# Patient Record
Sex: FEMALE | Race: WHITE | NOT HISPANIC OR LATINO | Employment: OTHER | URBAN - METROPOLITAN AREA
[De-identification: names, ages, dates, MRNs, and addresses within clinical notes are randomized per-mention and may not be internally consistent; named-entity substitution may affect disease eponyms.]

---

## 2017-01-10 ENCOUNTER — LAB (OUTPATIENT)
Dept: LAB | Facility: HOSPITAL | Age: 82
End: 2017-01-10

## 2017-01-10 ENCOUNTER — OFFICE VISIT (OUTPATIENT)
Dept: ONCOLOGY | Facility: CLINIC | Age: 82
End: 2017-01-10

## 2017-01-10 VITALS
TEMPERATURE: 98 F | BODY MASS INDEX: 26.79 KG/M2 | RESPIRATION RATE: 12 BRPM | OXYGEN SATURATION: 99 % | HEIGHT: 63 IN | SYSTOLIC BLOOD PRESSURE: 140 MMHG | WEIGHT: 151.2 LBS | DIASTOLIC BLOOD PRESSURE: 86 MMHG | HEART RATE: 68 BPM

## 2017-01-10 DIAGNOSIS — R13.10 DYSPHAGIA, UNSPECIFIED TYPE: ICD-10-CM

## 2017-01-10 DIAGNOSIS — K90.9 MALABSORPTION OF IRON: ICD-10-CM

## 2017-01-10 DIAGNOSIS — D50.9 IRON DEFICIENCY ANEMIA: ICD-10-CM

## 2017-01-10 DIAGNOSIS — D50.9 IRON DEFICIENCY ANEMIA, UNSPECIFIED IRON DEFICIENCY ANEMIA TYPE: ICD-10-CM

## 2017-01-10 DIAGNOSIS — D47.2 MONOCLONAL GAMMOPATHY: Primary | ICD-10-CM

## 2017-01-10 LAB
BASOPHILS # BLD AUTO: 0.04 10*3/MM3 (ref 0–0.1)
BASOPHILS NFR BLD AUTO: 0.7 % (ref 0–1.1)
DEPRECATED RDW RBC AUTO: 44.4 FL (ref 37–49)
EOSINOPHIL # BLD AUTO: 0.31 10*3/MM3 (ref 0–0.36)
EOSINOPHIL NFR BLD AUTO: 5.6 % (ref 1–5)
ERYTHROCYTE [DISTWIDTH] IN BLOOD BY AUTOMATED COUNT: 13.6 % (ref 11.7–14.5)
FERRITIN SERPL-MCNC: 12.6 NG/ML
HCT VFR BLD AUTO: 34 % (ref 34–45)
HGB BLD-MCNC: 10.5 G/DL (ref 11.5–14.9)
IMM GRANULOCYTES # BLD: 0.03 10*3/MM3 (ref 0–0.03)
IMM GRANULOCYTES NFR BLD: 0.5 % (ref 0–0.5)
IRON 24H UR-MRATE: 154 MCG/DL (ref 37–145)
IRON SATN MFR SERPL: 35 % (ref 14–48)
LYMPHOCYTES # BLD AUTO: 0.9 10*3/MM3 (ref 1–3.5)
LYMPHOCYTES NFR BLD AUTO: 16.4 % (ref 20–49)
MCH RBC QN AUTO: 27.7 PG (ref 27–33)
MCHC RBC AUTO-ENTMCNC: 30.9 G/DL (ref 32–35)
MCV RBC AUTO: 89.7 FL (ref 83–97)
MONOCYTES # BLD AUTO: 0.51 10*3/MM3 (ref 0.25–0.8)
MONOCYTES NFR BLD AUTO: 9.3 % (ref 4–12)
NEUTROPHILS # BLD AUTO: 3.7 10*3/MM3 (ref 1.5–7)
NEUTROPHILS NFR BLD AUTO: 67.5 % (ref 39–75)
NRBC BLD MANUAL-RTO: 0 /100 WBC (ref 0–0)
PLATELET # BLD AUTO: 203 10*3/MM3 (ref 150–375)
PMV BLD AUTO: 10.8 FL (ref 8.9–12.1)
RBC # BLD AUTO: 3.79 10*6/MM3 (ref 3.9–5)
TIBC SERPL-MCNC: 437 MCG/DL (ref 249–505)
TRANSFERRIN SERPL-MCNC: 312 MG/DL (ref 200–360)
WBC NRBC COR # BLD: 5.49 10*3/MM3 (ref 4–10)

## 2017-01-10 PROCEDURE — 83540 ASSAY OF IRON: CPT | Performed by: INTERNAL MEDICINE

## 2017-01-10 PROCEDURE — 82728 ASSAY OF FERRITIN: CPT | Performed by: INTERNAL MEDICINE

## 2017-01-10 PROCEDURE — 85025 COMPLETE CBC W/AUTO DIFF WBC: CPT | Performed by: INTERNAL MEDICINE

## 2017-01-10 PROCEDURE — 84466 ASSAY OF TRANSFERRIN: CPT | Performed by: INTERNAL MEDICINE

## 2017-01-10 PROCEDURE — 99214 OFFICE O/P EST MOD 30 MIN: CPT | Performed by: INTERNAL MEDICINE

## 2017-01-10 PROCEDURE — 36415 COLL VENOUS BLD VENIPUNCTURE: CPT | Performed by: INTERNAL MEDICINE

## 2017-01-10 RX ORDER — DIPHENHYDRAMINE HCL 25 MG
25 CAPSULE ORAL ONCE
Status: CANCELLED | OUTPATIENT
Start: 2017-01-12

## 2017-01-10 RX ORDER — SODIUM CHLORIDE 9 MG/ML
250 INJECTION, SOLUTION INTRAVENOUS ONCE
Status: CANCELLED | OUTPATIENT
Start: 2017-01-12

## 2017-01-10 NOTE — PROGRESS NOTES
Subjective     CHIEF COMPLAINT:    1. Monoclonal gammopathy of undetermined significance.   2. Anemia.     HISTORY OF PRESENT ILLNESS:     Randi Mera is an 89 y.o. patient who returns today for follow-up accompanied by her son.  She reports having dysphagia.  She feels that her symptoms were getting worse while taking oral iron.  Therefore, she stopped oral iron and she has been taking gummy vitamins once daily.    She denies noticing blood in the stool or urine.  She denies having any melena.      Past Medical History   Diagnosis Date   • Anemia    • Degenerative arthritis    • Fatigue    • Monoclonal gammopathy of undetermined significance    • Rheumatic fever      AS A CHILD       Past Surgical History   Procedure Laterality Date   • Replacement total knee Right 2000   • Cholecystectomy  2008       Cancer-related family history includes Cancer in her brother and brother.    SCHEDULED MEDS:    Current Outpatient Prescriptions:   •  amLODIPine (NORVASC) 10 MG tablet, Take 10 mg by mouth daily., Disp: , Rfl:   •  aspirin 81 MG EC tablet, Take 81 mg by mouth daily., Disp: , Rfl:   •  escitalopram (LEXAPRO) 10 MG tablet, Take 10 mg by mouth daily., Disp: , Rfl:   •  ferrous sulfate 325 (65 FE) MG tablet, Take 325 mg by mouth 3 (three) times a day., Disp: , Rfl:   •  hydrochlorothiazide (HYDRODIURIL) 25 MG tablet, TAKE ONE TABLET BY MOUTH DAILY, Disp: , Rfl: 5  •  metoclopramide (REGLAN) 10 MG tablet, TAKE ONE TABLET BY MOUTH FOUR TIMES DAILY, Disp: , Rfl: 11  •  pantoprazole (PROTONIX) 40 MG EC tablet, TAKE ONE TABLET BY MOUTH EVERY DAY, Disp: , Rfl: 5  •  sucralfate (CARAFATE) 1 G tablet, TAKE ONE TABLET BY MOUTH FOUR TIMES DAILY ON EMPTY stomach ONE hour BEFORE MEALS AND AT BEDTIME, Disp: , Rfl: 11  •  SYMBICORT 160-4.5 MCG/ACT inhaler, TWO PUFFS EVERY TWELVE HOURS RINSE MOUTH AFTER EACH USE., Disp: , Rfl: 11    REVIEW OF SYSTEMS:  GENERAL:  Fatigue.   SKIN:  No rashes or lesions.  HEME/LYMPH: Anemia.  No  "bruising or bleeding.  EYES:  No vision changes or diplopia.  ENT:  No mouth or gum bleeding, epistaxis, hoarseness or dysphagia.  RESPIRATORY:  No cough, shortness of breath, hemoptysis or wheezing.  CVS:  No chest pain, palpitations or lower extremity swelling.  GI:  No dysphagia.  Stomach upset after taking iron tablets.  :  No dysuria, hematuria or increased frequency.   MUSCULOSKELETAL:  No bone pain or joint stiffness.  NEUROLOGICAL:  Difficulty with word finding.  PSYCHIATRIC:  No anxiety, mood changes or depression.  .  Objective   VITAL SIGNS:  Vitals:    01/10/17 1335   BP: 140/86   Pulse: 68   Resp: 12   Temp: 98 °F (36.7 °C)   TempSrc: Oral   SpO2: 99%   Weight: 151 lb 3.2 oz (68.6 kg)   Height: 63.39\" (161 cm)  Comment: new height   PainSc: 0-No pain       Wt Readings from Last 3 Encounters:   01/10/17 151 lb 3.2 oz (68.6 kg)   07/26/16 150 lb 12.8 oz (68.4 kg)   05/19/16 155 lb 12.8 oz (70.7 kg)       PHYSICAL EXAMINATION:  GENERAL:  The patient appears in good general condition, not in acute distress.  SKIN:  No skin rashes or lesions. No ecchymosis or petechiae.  HEAD:  Normocephalic.  EYES:  No jaundice.  Pale.   LYMPHATICS:  No cervical or supraclavicular lymphadenopathy.  ABDOMEN:  Soft and non-tender. No hepato- or splenomegaly. No masses.  EXTREMITIES:  No cyanosis or edema. No calf tenderness.  .  RESULT REVIEW:       Results from last 7 days  Lab Units 01/10/17  1308   WBC 10*3/mm3 5.49   HEMOGLOBIN g/dL 10.5*   HEMATOCRIT % 34.0   PLATELETS 10*3/mm3 203       Results from last 7 days  Lab Units 01/10/17  1308   FERRITIN ng/mL 12.60   IRON mcg/dL 154*   TIBC mcg/dL 437           Assessment/Plan    1.  Iron deficiency anemia.  Her hemoglobin has worsened.  She is not tolerating oral iron with development of upset stomach and worsening dysphagia.  She, she was not absorbing the iron in the multivitamin. Therefore, I recommended IV iron in the form of IV Feraheme.  I discussed side effects " including infusion reactions which are not common.  Patient agreed to proceed.  I asked her to stop the oral iron.  We will schedule her to receive IV iron in 1 and in 2 weeks.    2.  Monoclonal gammopathy of undetermined significance. M protein was 0.5 in May 2016.  We will repeat the level in 4 months from now.      3.  Dysphagia and upset stomach.  She has history of hiatal hernia.  I recommended evaluation by gastroenterology.  She is on Carafate and Protonix.    We will see the patient follow-up in 4 months with a CBC ferritin and iron panel at her return visit.      Nadir Ceja MD  01/10/17

## 2017-01-12 ENCOUNTER — INFUSION (OUTPATIENT)
Dept: ONCOLOGY | Facility: HOSPITAL | Age: 82
End: 2017-01-12

## 2017-01-12 VITALS
OXYGEN SATURATION: 97 % | TEMPERATURE: 97.5 F | SYSTOLIC BLOOD PRESSURE: 167 MMHG | BODY MASS INDEX: 25.9 KG/M2 | WEIGHT: 148 LBS | DIASTOLIC BLOOD PRESSURE: 89 MMHG | HEART RATE: 70 BPM

## 2017-01-12 DIAGNOSIS — D50.9 IRON DEFICIENCY ANEMIA, UNSPECIFIED IRON DEFICIENCY ANEMIA TYPE: ICD-10-CM

## 2017-01-12 DIAGNOSIS — R13.10 DYSPHAGIA, UNSPECIFIED TYPE: Primary | ICD-10-CM

## 2017-01-12 PROCEDURE — 25010000002 FERUMOXYTOL 510 MG/17ML SOLUTION 510 MG VIAL: Performed by: INTERNAL MEDICINE

## 2017-01-12 PROCEDURE — 63710000001 DIPHENHYDRAMINE PER 50 MG: Performed by: INTERNAL MEDICINE

## 2017-01-12 PROCEDURE — 96375 TX/PRO/DX INJ NEW DRUG ADDON: CPT | Performed by: INTERNAL MEDICINE

## 2017-01-12 PROCEDURE — 96374 THER/PROPH/DIAG INJ IV PUSH: CPT | Performed by: INTERNAL MEDICINE

## 2017-01-12 RX ORDER — DIPHENHYDRAMINE HCL 25 MG
25 CAPSULE ORAL ONCE
Status: COMPLETED | OUTPATIENT
Start: 2017-01-12 | End: 2017-01-12

## 2017-01-12 RX ORDER — DIPHENHYDRAMINE HCL 25 MG
25 CAPSULE ORAL ONCE
Status: CANCELLED | OUTPATIENT
Start: 2017-01-19

## 2017-01-12 RX ORDER — SODIUM CHLORIDE 9 MG/ML
250 INJECTION, SOLUTION INTRAVENOUS ONCE
Status: CANCELLED | OUTPATIENT
Start: 2017-01-19

## 2017-01-12 RX ORDER — SODIUM CHLORIDE 9 MG/ML
250 INJECTION, SOLUTION INTRAVENOUS ONCE
Status: COMPLETED | OUTPATIENT
Start: 2017-01-12 | End: 2017-01-12

## 2017-01-12 RX ADMIN — DIPHENHYDRAMINE HYDROCHLORIDE 25 MG: 25 CAPSULE ORAL at 13:21

## 2017-01-12 RX ADMIN — FAMOTIDINE 20 MG: 10 INJECTION INTRAVENOUS at 13:21

## 2017-01-12 RX ADMIN — SODIUM CHLORIDE 250 ML: 900 INJECTION, SOLUTION INTRAVENOUS at 13:21

## 2017-01-12 RX ADMIN — FERUMOXYTOL 510 MG: 510 INJECTION INTRAVENOUS at 13:53

## 2017-01-19 ENCOUNTER — INFUSION (OUTPATIENT)
Dept: ONCOLOGY | Facility: HOSPITAL | Age: 82
End: 2017-01-19

## 2017-01-19 VITALS
BODY MASS INDEX: 26.6 KG/M2 | WEIGHT: 152 LBS | HEART RATE: 67 BPM | DIASTOLIC BLOOD PRESSURE: 82 MMHG | TEMPERATURE: 98 F | SYSTOLIC BLOOD PRESSURE: 134 MMHG

## 2017-01-19 DIAGNOSIS — R13.10 DYSPHAGIA, UNSPECIFIED TYPE: Primary | ICD-10-CM

## 2017-01-19 DIAGNOSIS — D50.9 IRON DEFICIENCY ANEMIA, UNSPECIFIED IRON DEFICIENCY ANEMIA TYPE: ICD-10-CM

## 2017-01-19 PROCEDURE — 25010000002 FERUMOXYTOL 510 MG/17ML SOLUTION 510 MG VIAL: Performed by: INTERNAL MEDICINE

## 2017-01-19 PROCEDURE — 63710000001 DIPHENHYDRAMINE PER 50 MG: Performed by: INTERNAL MEDICINE

## 2017-01-19 PROCEDURE — 96374 THER/PROPH/DIAG INJ IV PUSH: CPT | Performed by: INTERNAL MEDICINE

## 2017-01-19 PROCEDURE — 96375 TX/PRO/DX INJ NEW DRUG ADDON: CPT | Performed by: INTERNAL MEDICINE

## 2017-01-19 RX ORDER — DIPHENHYDRAMINE HCL 25 MG
25 CAPSULE ORAL ONCE
Status: COMPLETED | OUTPATIENT
Start: 2017-01-19 | End: 2017-01-19

## 2017-01-19 RX ORDER — SODIUM CHLORIDE 9 MG/ML
250 INJECTION, SOLUTION INTRAVENOUS ONCE
Status: COMPLETED | OUTPATIENT
Start: 2017-01-19 | End: 2017-01-19

## 2017-01-19 RX ORDER — SODIUM CHLORIDE 9 MG/ML
250 INJECTION, SOLUTION INTRAVENOUS ONCE
Status: CANCELLED | OUTPATIENT
Start: 2017-01-26

## 2017-01-19 RX ORDER — DIPHENHYDRAMINE HCL 25 MG
25 CAPSULE ORAL ONCE
Status: CANCELLED | OUTPATIENT
Start: 2017-01-26

## 2017-01-19 RX ADMIN — SODIUM CHLORIDE 250 ML: 900 INJECTION, SOLUTION INTRAVENOUS at 12:49

## 2017-01-19 RX ADMIN — DIPHENHYDRAMINE HYDROCHLORIDE 25 MG: 25 CAPSULE ORAL at 12:47

## 2017-01-19 RX ADMIN — FAMOTIDINE 20 MG: 10 INJECTION INTRAVENOUS at 12:48

## 2017-01-19 RX ADMIN — FERUMOXYTOL 510 MG: 510 INJECTION INTRAVENOUS at 13:10

## 2017-05-02 ENCOUNTER — OFFICE VISIT (OUTPATIENT)
Dept: ONCOLOGY | Facility: CLINIC | Age: 82
End: 2017-05-02

## 2017-05-02 ENCOUNTER — LAB (OUTPATIENT)
Dept: LAB | Facility: HOSPITAL | Age: 82
End: 2017-05-02

## 2017-05-02 VITALS
WEIGHT: 148.4 LBS | RESPIRATION RATE: 16 BRPM | OXYGEN SATURATION: 98 % | SYSTOLIC BLOOD PRESSURE: 150 MMHG | BODY MASS INDEX: 26.29 KG/M2 | HEIGHT: 63 IN | DIASTOLIC BLOOD PRESSURE: 90 MMHG | TEMPERATURE: 97.6 F | HEART RATE: 69 BPM

## 2017-05-02 DIAGNOSIS — D50.9 IRON DEFICIENCY ANEMIA, UNSPECIFIED IRON DEFICIENCY ANEMIA TYPE: Primary | ICD-10-CM

## 2017-05-02 DIAGNOSIS — I10 ESSENTIAL HYPERTENSION: ICD-10-CM

## 2017-05-02 DIAGNOSIS — D47.2 MONOCLONAL GAMMOPATHY: ICD-10-CM

## 2017-05-02 DIAGNOSIS — R13.10 DYSPHAGIA, UNSPECIFIED TYPE: ICD-10-CM

## 2017-05-02 LAB
BASOPHILS # BLD AUTO: 0.04 10*3/MM3 (ref 0–0.1)
BASOPHILS NFR BLD AUTO: 0.8 % (ref 0–1.1)
DEPRECATED RDW RBC AUTO: 47.1 FL (ref 37–49)
EOSINOPHIL # BLD AUTO: 0.2 10*3/MM3 (ref 0–0.36)
EOSINOPHIL NFR BLD AUTO: 4.2 % (ref 1–5)
ERYTHROCYTE [DISTWIDTH] IN BLOOD BY AUTOMATED COUNT: 13.8 % (ref 11.7–14.5)
FERRITIN SERPL-MCNC: 50.1 NG/ML
HCT VFR BLD AUTO: 40.5 % (ref 34–45)
HGB BLD-MCNC: 12.9 G/DL (ref 11.5–14.9)
IMM GRANULOCYTES # BLD: 0.03 10*3/MM3 (ref 0–0.03)
IMM GRANULOCYTES NFR BLD: 0.6 % (ref 0–0.5)
IRON 24H UR-MRATE: 71 MCG/DL (ref 37–145)
IRON SATN MFR SERPL: 20 % (ref 14–48)
LYMPHOCYTES # BLD AUTO: 0.96 10*3/MM3 (ref 1–3.5)
LYMPHOCYTES NFR BLD AUTO: 20.1 % (ref 20–49)
MCH RBC QN AUTO: 29.2 PG (ref 27–33)
MCHC RBC AUTO-ENTMCNC: 31.9 G/DL (ref 32–35)
MCV RBC AUTO: 91.6 FL (ref 83–97)
MONOCYTES # BLD AUTO: 0.52 10*3/MM3 (ref 0.25–0.8)
MONOCYTES NFR BLD AUTO: 10.9 % (ref 4–12)
NEUTROPHILS # BLD AUTO: 3.03 10*3/MM3 (ref 1.5–7)
NEUTROPHILS NFR BLD AUTO: 63.4 % (ref 39–75)
NRBC BLD MANUAL-RTO: 0 /100 WBC (ref 0–0)
PLATELET # BLD AUTO: 194 10*3/MM3 (ref 150–375)
PMV BLD AUTO: 10.1 FL (ref 8.9–12.1)
RBC # BLD AUTO: 4.42 10*6/MM3 (ref 3.9–5)
TIBC SERPL-MCNC: 358 MCG/DL (ref 249–505)
TRANSFERRIN SERPL-MCNC: 256 MG/DL (ref 200–360)
WBC NRBC COR # BLD: 4.78 10*3/MM3 (ref 4–10)

## 2017-05-02 PROCEDURE — 85025 COMPLETE CBC W/AUTO DIFF WBC: CPT

## 2017-05-02 PROCEDURE — 84466 ASSAY OF TRANSFERRIN: CPT

## 2017-05-02 PROCEDURE — 99214 OFFICE O/P EST MOD 30 MIN: CPT | Performed by: INTERNAL MEDICINE

## 2017-05-02 PROCEDURE — 36415 COLL VENOUS BLD VENIPUNCTURE: CPT

## 2017-05-02 PROCEDURE — 82728 ASSAY OF FERRITIN: CPT

## 2017-05-02 PROCEDURE — 83540 ASSAY OF IRON: CPT

## 2017-05-02 RX ORDER — HYDROCHLOROTHIAZIDE 25 MG/1
25 TABLET ORAL DAILY
Qty: 30 TABLET | Refills: 0 | Status: SHIPPED | OUTPATIENT
Start: 2017-05-02 | End: 2017-08-22

## 2017-05-02 RX ORDER — SOLIFENACIN SUCCINATE 5 MG/1
TABLET, FILM COATED ORAL
Refills: 0 | COMMUNITY
Start: 2017-03-10 | End: 2019-01-16 | Stop reason: ALTCHOICE

## 2017-05-02 RX ORDER — FERROUS SULFATE 325(65) MG
325 TABLET ORAL
COMMUNITY
End: 2017-08-22

## 2017-07-05 ENCOUNTER — OFFICE VISIT (OUTPATIENT)
Dept: GASTROENTEROLOGY | Facility: CLINIC | Age: 82
End: 2017-07-05

## 2017-07-05 VITALS
BODY MASS INDEX: 27.72 KG/M2 | DIASTOLIC BLOOD PRESSURE: 82 MMHG | HEIGHT: 61 IN | SYSTOLIC BLOOD PRESSURE: 132 MMHG | WEIGHT: 146.8 LBS | TEMPERATURE: 97.7 F

## 2017-07-05 DIAGNOSIS — R19.7 DIARRHEA, UNSPECIFIED TYPE: ICD-10-CM

## 2017-07-05 DIAGNOSIS — R12 HEARTBURN: ICD-10-CM

## 2017-07-05 DIAGNOSIS — R13.14 PHARYNGOESOPHAGEAL DYSPHAGIA: Primary | ICD-10-CM

## 2017-07-05 PROCEDURE — 99204 OFFICE O/P NEW MOD 45 MIN: CPT | Performed by: INTERNAL MEDICINE

## 2017-07-05 NOTE — PATIENT INSTRUCTIONS
Schedule the esophagram test    Start the citrucel twice a day with meals    Take pantoprazole every morning before breakfast    For any additional questions, concerns or changes to your condition after today's office visit please contact the office at [phone].

## 2017-07-05 NOTE — PROGRESS NOTES
"Chief Complaint   Patient presents with   • Diarrhea   • Heartburn   • Difficulty Swallowing       Subjective     HPI    Randi Mera is a 89 y.o. female with a past medical history noted below who presents for evaluation of dysphagia, heartburn and diarrhea.      Dysphagia present for \"years.\"  About 1 time per week, she will have food \"hang up\" at her lower esophagus.  Happens with dry crusty foods, meats.  When she either swallows too quickly or does not chew her food well.  She has required visits to the emergency room where they have given her carbonated beverages which tend to help her symptoms.  Either the food will pass or she will have regurgitation.  He reports what sounds like an esophagram greater than 20 years ago.  She finds that she does well she chews her food \"to a gruel\" and drinks Sprite or ginger ale with her meals.    Heartburn-- she is on, carafate in the morning and before meals seems to help.  Also on pantop but takes it at about noon    Diarrhea has been occurring about once per week for the past 3 weeks.  Baseline of a formed stool every other day.  Diarrhea will be profuse watery stool for about 4 episodes. No obvious precipitants-- either medication or foods.    Iron deficiency followed by hematology-- had been on oral supplementation and IV iron.  6/30 labs reviewed and Hb 12.2 and ferritin of 22.  She will see Dr Patton this month.    Weight has been stable. Denies nausea or vomiting    She reports a colonoscopy about 5 years at [facility] and reports this was normal.  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  No family history of GI malignancy.    Past Medical History:   Diagnosis Date   • Anemia    • Degenerative arthritis    • Fatigue    • GERD (gastroesophageal reflux disease)    • Hypertension    • Monoclonal gammopathy of undetermined significance    • Rheumatic fever     AS A CHILD         Current Outpatient Prescriptions:   •  amLODIPine (NORVASC) " 10 MG tablet, Take 10 mg by mouth daily., Disp: , Rfl:   •  aspirin 81 MG EC tablet, Take 81 mg by mouth daily., Disp: , Rfl:   •  escitalopram (LEXAPRO) 10 MG tablet, Take 10 mg by mouth daily., Disp: , Rfl:   •  Multiple Vitamins-Minerals (CENTRUM SILVER PO), Take  by mouth Daily., Disp: , Rfl:   •  Multiple Vitamins-Minerals (OCUVITE PO), Take  by mouth Daily., Disp: , Rfl:   •  pantoprazole (PROTONIX) 40 MG EC tablet, TAKE ONE TABLET BY MOUTH EVERY DAY, Disp: , Rfl: 5  •  sucralfate (CARAFATE) 1 G tablet, TAKE ONE TABLET BY MOUTH FOUR TIMES DAILY ON EMPTY stomach ONE hour BEFORE MEALS AND AT BEDTIME, Disp: , Rfl: 11  •  SYMBICORT 160-4.5 MCG/ACT inhaler, TWO PUFFS EVERY TWELVE HOURS RINSE MOUTH AFTER EACH USE., Disp: , Rfl: 11  •  VESICARE 5 MG tablet, TAKE ONE TABLET BY MOUTH DAILY, Disp: , Rfl: 0  •  cephalexin (KEFLEX) 500 MG capsule, Take 1 capsule by mouth 3 (Three) Times a Day., Disp: 21 capsule, Rfl: 0  •  ferrous sulfate 325 (65 FE) MG tablet, Take 325 mg by mouth Daily With Breakfast., Disp: , Rfl:   •  hydrochlorothiazide (HYDRODIURIL) 25 MG tablet, Take 1 tablet by mouth Daily., Disp: 30 tablet, Rfl: 0  •  methylcellulose, Laxative, (CITRUCEL) 500 MG tablet tablet, Take 2 tablets by mouth 2 (Two) Times a Day With Meals., Disp: 120 tablet, Rfl: 3  •  metoclopramide (REGLAN) 10 MG tablet, TAKE ONE TABLET BY MOUTH FOUR TIMES DAILY, Disp: , Rfl: 11    Allergies   Allergen Reactions   • Actonel [Risedronate Sodium]    • Furacin [Nitrofurazone]    • Lambs Quarters    • Latex    • Prozac [Fluoxetine Hcl]    • Shellfish-Derived Products        Social History     Social History   • Marital status:      Spouse name: N/A   • Number of children: N/A   • Years of education: N/A     Occupational History   • Pharmacist Retired     Social History Main Topics   • Smoking status: Current Every Day Smoker     Packs/day: 0.25     Types: Cigarettes     Start date: 1940   • Smokeless tobacco: Former User       Comment: off and on thru out life   • Alcohol use Yes      Comment: seldom   • Drug use: No   • Sexual activity: Not on file     Other Topics Concern   • Not on file     Social History Narrative       Family History   Problem Relation Age of Onset   • Cancer Brother      Lung   • Cancer Brother      Prostate   • No Known Problems Mother    • No Known Problems Father        Review of Systems   Constitutional: Negative for activity change, appetite change and fatigue.   HENT: Positive for trouble swallowing. Negative for congestion and sore throat.    Respiratory: Negative.    Cardiovascular: Negative.    Gastrointestinal: Positive for diarrhea. Negative for abdominal distention, abdominal pain, blood in stool and nausea.   Endocrine: Negative for cold intolerance and heat intolerance.   Genitourinary: Negative for difficulty urinating, dysuria and frequency.   Musculoskeletal: Negative for arthralgias, back pain and myalgias.   Skin: Negative.    Hematological: Negative for adenopathy. Does not bruise/bleed easily.   All other systems reviewed and are negative.      Objective     Vitals:    07/05/17 0834   BP: 132/82   Temp: 97.7 °F (36.5 °C)     Last 2 weights    07/05/17  0834   Weight: 146 lb 12.8 oz (66.6 kg)     Body mass index is 27.74 kg/(m^2).    Physical Exam   Constitutional: She is oriented to person, place, and time. She appears well-developed and well-nourished. No distress.   HENT:   Head: Normocephalic and atraumatic.   Right Ear: External ear normal.   Left Ear: External ear normal.   Nose: Nose normal.   Mouth/Throat: Oropharynx is clear and moist.   Eyes: Conjunctivae and EOM are normal. Right eye exhibits no discharge. Left eye exhibits no discharge. No scleral icterus.   Neck: Normal range of motion. Neck supple. No thyromegaly present.   No supraclavicular adenopathy   Cardiovascular: Normal rate, regular rhythm, normal heart sounds and intact distal pulses.  Exam reveals no gallop.    No murmur  heard.  No lower extremity edema   Pulmonary/Chest: Effort normal and breath sounds normal. No respiratory distress. She has no wheezes.   Abdominal: Soft. Normal appearance and bowel sounds are normal. She exhibits no distension and no mass. There is no hepatosplenomegaly. There is no tenderness. There is no rigidity, no rebound and no guarding. No hernia.   Genitourinary:   Genitourinary Comments: Rectal exam deferred   Musculoskeletal: Normal range of motion. She exhibits no edema or tenderness.   No atrophy of upper or lower extremities.  Normal digits and nails of both hands.   Lymphadenopathy:     She has no cervical adenopathy.   Neurological: She is alert and oriented to person, place, and time. She displays no atrophy. Coordination normal.   Skin: Skin is warm and dry. No rash noted. She is not diaphoretic. No erythema.   Psychiatric: She has a normal mood and affect. Her behavior is normal. Judgment and thought content normal.   Vitals reviewed.      WBC   Date Value Ref Range Status   05/02/2017 4.78 4.00 - 10.00 10*3/mm3 Final     RBC   Date Value Ref Range Status   05/02/2017 4.42 3.90 - 5.00 10*6/mm3 Final     Hemoglobin   Date Value Ref Range Status   05/02/2017 12.9 11.5 - 14.9 g/dL Final     Hematocrit   Date Value Ref Range Status   05/02/2017 40.5 34.0 - 45.0 % Final     MCV   Date Value Ref Range Status   05/02/2017 91.6 83.0 - 97.0 fL Final     MCH   Date Value Ref Range Status   05/02/2017 29.2 27.0 - 33.0 pg Final     MCHC   Date Value Ref Range Status   05/02/2017 31.9 (L) 32.0 - 35.0 g/dL Final     RDW   Date Value Ref Range Status   05/02/2017 13.8 11.7 - 14.5 % Final     RDW-SD   Date Value Ref Range Status   05/02/2017 47.1 37.0 - 49.0 fl Final     MPV   Date Value Ref Range Status   05/02/2017 10.1 8.9 - 12.1 fL Final     Platelets   Date Value Ref Range Status   05/02/2017 194 150 - 375 10*3/mm3 Final     Neutrophil %   Date Value Ref Range Status   05/02/2017 63.4 39.0 - 75.0 % Final      Lymphocyte %   Date Value Ref Range Status   05/02/2017 20.1 20.0 - 49.0 % Final     Monocyte %   Date Value Ref Range Status   05/02/2017 10.9 4.0 - 12.0 % Final     Eosinophil %   Date Value Ref Range Status   05/02/2017 4.2 1.0 - 5.0 % Final     Basophil %   Date Value Ref Range Status   05/02/2017 0.8 0.0 - 1.1 % Final     Immature Grans %   Date Value Ref Range Status   05/02/2017 0.6 (H) 0.0 - 0.5 % Final     Neutrophils, Absolute   Date Value Ref Range Status   05/02/2017 3.03 1.50 - 7.00 10*3/mm3 Final     Lymphocytes, Absolute   Date Value Ref Range Status   05/02/2017 0.96 (L) 1.00 - 3.50 10*3/mm3 Final     Monocytes, Absolute   Date Value Ref Range Status   05/02/2017 0.52 0.25 - 0.80 10*3/mm3 Final     Eosinophils, Absolute   Date Value Ref Range Status   05/02/2017 0.20 0.00 - 0.36 10*3/mm3 Final     Basophils, Absolute   Date Value Ref Range Status   05/02/2017 0.04 0.00 - 0.10 10*3/mm3 Final     Immature Grans, Absolute   Date Value Ref Range Status   05/02/2017 0.03 0.00 - 0.03 10*3/mm3 Final     nRBC   Date Value Ref Range Status   05/02/2017 0.0 0.0 - 0.0 /100 WBC Final       Albumin   Date Value Ref Range Status   05/19/2016 3.4 3.2 - 5.6 g/dL Final   05/19/2016 3.4 3.2 - 5.6 g/dL Final     A/G Ratio   Date Value Ref Range Status   05/19/2016 1.3 0.7 - 2.0 Final   05/19/2016 1.3 0.7 - 2.0 Final         Imaging Results (last 7 days)     ** No results found for the last 168 hours. **            No notes on file    Assessment/Plan    1. Dysphagia: chronic issue, no recent workup.  Differential of hiatal hernia, strictrue, ring, dysmotility  2. Diarrhea: new issue, intermittent episodes.  I am suspicious for brief impaction followed by loose stools  3. Heartburn: chronic issue, mostly controlled with carafate and pantop    Plan  -esophagram for further eval of dysphagia  -to take pantop 30 mins before breakfast  -advised to ER for any food sticking >1 hour  -citrucel supplementation for  regularity  -back in 6 weeks to assess    Randi was seen today for diarrhea, heartburn and difficulty swallowing.    Diagnoses and all orders for this visit:    Pharyngoesophageal dysphagia  -     FL Esophagram Complete; Future    Diarrhea, unspecified type  -     methylcellulose, Laxative, (CITRUCEL) 500 MG tablet tablet; Take 2 tablets by mouth 2 (Two) Times a Day With Meals.    Heartburn        I have discussed the above plan with the patient.  They verbalize understanding and are in agreement with the plan.  They have been advised to contact the office for any questions, concerns, or changes related to their health.    Dictated utilizing Dragon dictation

## 2017-07-17 ENCOUNTER — HOSPITAL ENCOUNTER (OUTPATIENT)
Dept: GENERAL RADIOLOGY | Facility: HOSPITAL | Age: 82
Discharge: HOME | End: 2017-07-17
Attending: INTERNAL MEDICINE | Admitting: INTERNAL MEDICINE

## 2017-07-17 DIAGNOSIS — R13.14 PHARYNGOESOPHAGEAL DYSPHAGIA: ICD-10-CM

## 2017-07-17 PROCEDURE — 74220 X-RAY XM ESOPHAGUS 1CNTRST: CPT

## 2017-07-18 NOTE — PROGRESS NOTES
Called and discussed esophagram results.  Gave options of speech evaluation, EGD, or do nothing.  She reports that her symptoms have improved since taking her protonix earlier in the day.  Wishes to wait until her follow up appt 8/18 to make a decision.  I think this is reasonable.

## 2017-08-18 ENCOUNTER — OFFICE VISIT (OUTPATIENT)
Dept: GASTROENTEROLOGY | Facility: CLINIC | Age: 82
End: 2017-08-18

## 2017-08-18 VITALS
DIASTOLIC BLOOD PRESSURE: 78 MMHG | TEMPERATURE: 98 F | SYSTOLIC BLOOD PRESSURE: 130 MMHG | WEIGHT: 148.4 LBS | BODY MASS INDEX: 28.02 KG/M2 | HEIGHT: 61 IN

## 2017-08-18 DIAGNOSIS — K21.9 GASTROESOPHAGEAL REFLUX DISEASE, ESOPHAGITIS PRESENCE NOT SPECIFIED: ICD-10-CM

## 2017-08-18 DIAGNOSIS — T17.908A ASPIRATION INTO AIRWAY, INITIAL ENCOUNTER: ICD-10-CM

## 2017-08-18 DIAGNOSIS — R13.14 PHARYNGOESOPHAGEAL DYSPHAGIA: Primary | ICD-10-CM

## 2017-08-18 PROCEDURE — 99214 OFFICE O/P EST MOD 30 MIN: CPT | Performed by: INTERNAL MEDICINE

## 2017-08-18 RX ORDER — HYDROCHLOROTHIAZIDE 25 MG/1
TABLET ORAL
Qty: 30 TABLET | OUTPATIENT
Start: 2017-08-18

## 2017-08-18 NOTE — PROGRESS NOTES
Chief Complaint   Patient presents with   • Diarrhea   • Heartburn   • Difficulty Swallowing     Subjective     HPI  Randi Mera is a 89 y.o. female who presents for follow-up of dysphagia, diarrhea, and heartburn.  Her July 17 esophagram was notable for silent aspiration, a small Zenker's diverticulum, a large sliding hiatal hernia and a narrowing at the esophago-gastric junction at 1.6 cm.  she is seen with her daughter today.  She reports that symptoms are improved.  She denies any further episodes with things sticking.  Her daughter endorses that there may have been an issue where she was answering the phone while she was eating and choking as she started to speak.  She says that she has stopped the Carafate and has been doing well.  She stopped the Carafate because the pill burden was too much.  She remains on pantoprazole.  She says that she has felt the best that she has felt in a number of years.  She reports that bowel movements are normal.  Her weight is stable.  She is not avoiding any particular foods including when I asked her about dry breads or tough and chewing meats.  I brought up the issue of the silent aspiration, she and her daughter both deny any issues with frequent upper respiratory infections, coughing or sputtering with eating or frequent pneumonias.    She does report an episode about a week to 2 weeks ago where she awoke with numbness of both of her lower legs.  This did go away.  She has not notified her primary care physician.    Past Medical History:   Diagnosis Date   • Anemia    • Degenerative arthritis    • Fatigue    • GERD (gastroesophageal reflux disease)    • Hiatal hernia    • Hypertension    • Monoclonal gammopathy of undetermined significance    • Rheumatic fever     AS A CHILD   • Schatzki's ring    • Ulcer    • Zenker diverticulum        Social History     Social History   • Marital status:      Spouse name: N/A   • Number of children: N/A   • Years of education:  N/A     Occupational History   • Pharmacist Retired     Social History Main Topics   • Smoking status: Current Every Day Smoker     Packs/day: 0.25     Types: Cigarettes     Start date: 1940   • Smokeless tobacco: Former User      Comment: off and on thru out life   • Alcohol use Yes      Comment: seldom   • Drug use: No   • Sexual activity: Not Asked     Other Topics Concern   • None     Social History Narrative         Current Outpatient Prescriptions:   •  amLODIPine (NORVASC) 10 MG tablet, Take 10 mg by mouth daily., Disp: , Rfl:   •  aspirin 81 MG EC tablet, Take 81 mg by mouth daily., Disp: , Rfl:   •  escitalopram (LEXAPRO) 10 MG tablet, Take 10 mg by mouth daily., Disp: , Rfl:   •  hydrochlorothiazide (HYDRODIURIL) 25 MG tablet, Take 1 tablet by mouth Daily., Disp: 30 tablet, Rfl: 0  •  methylcellulose, Laxative, (CITRUCEL) 500 MG tablet tablet, Take 2 tablets by mouth 2 (Two) Times a Day With Meals., Disp: 120 tablet, Rfl: 3  •  Multiple Vitamins-Minerals (CENTRUM SILVER PO), Take  by mouth Daily., Disp: , Rfl:   •  Multiple Vitamins-Minerals (OCUVITE PO), Take  by mouth Daily., Disp: , Rfl:   •  pantoprazole (PROTONIX) 40 MG EC tablet, TAKE ONE TABLET BY MOUTH EVERY DAY, Disp: , Rfl: 5  •  SYMBICORT 160-4.5 MCG/ACT inhaler, TWO PUFFS EVERY TWELVE HOURS RINSE MOUTH AFTER EACH USE., Disp: , Rfl: 11  •  cephalexin (KEFLEX) 500 MG capsule, Take 1 capsule by mouth 3 (Three) Times a Day., Disp: 21 capsule, Rfl: 0  •  ferrous sulfate 325 (65 FE) MG tablet, Take 325 mg by mouth Daily With Breakfast., Disp: , Rfl:   •  metoclopramide (REGLAN) 10 MG tablet, TAKE ONE TABLET BY MOUTH FOUR TIMES DAILY, Disp: , Rfl: 11  •  sucralfate (CARAFATE) 1 G tablet, TAKE ONE TABLET BY MOUTH FOUR TIMES DAILY ON EMPTY stomach ONE hour BEFORE MEALS AND AT BEDTIME, Disp: , Rfl: 11  •  VESICARE 5 MG tablet, TAKE ONE TABLET BY MOUTH DAILY, Disp: , Rfl: 0    Review of Systems   Constitutional: Negative for activity change, appetite  change and fatigue.   HENT: Negative for sore throat and trouble swallowing.    Gastrointestinal: Negative for abdominal distention, abdominal pain and blood in stool.   Endocrine: Negative for cold intolerance and heat intolerance.   Genitourinary: Negative for difficulty urinating, dysuria and frequency.   Musculoskeletal: Negative for arthralgias, back pain and myalgias.   Hematological: Negative for adenopathy. Does not bruise/bleed easily.   All other systems reviewed and are negative.      Objective   Vitals:    08/18/17 1432   BP: 130/78   Temp: 98 °F (36.7 °C)     Last Weight    08/18/17  1432   Weight: 148 lb 6.4 oz (67.3 kg)     Body mass index is 28.04 kg/(m^2).      Physical Exam   Constitutional: She is oriented to person, place, and time. She appears well-developed and well-nourished. No distress.   HENT:   Head: Normocephalic and atraumatic.   Right Ear: External ear normal.   Left Ear: External ear normal.   Nose: Nose normal.   Eyes: Conjunctivae and EOM are normal. No scleral icterus.   Cardiovascular: Normal rate, regular rhythm, normal heart sounds and intact distal pulses.  Exam reveals no gallop.    No murmur heard.  No lower extremity edema   Pulmonary/Chest: Effort normal and breath sounds normal. No respiratory distress. She has no wheezes.   Abdominal: Soft. Normal appearance and bowel sounds are normal. She exhibits no distension and no mass. There is no hepatosplenomegaly. There is no rigidity, no rebound and no guarding.   Genitourinary:   Genitourinary Comments: Rectal exam deferred   Musculoskeletal: Normal range of motion. She exhibits no edema or tenderness.   Normal digits and nails of both hands.  No atrophy or wasting of upper or lower extremeties   Neurological: She is alert and oriented to person, place, and time. She displays no atrophy. Coordination normal.   Skin: Skin is warm and dry. No rash noted. She is not diaphoretic. No erythema.   Psychiatric: She has a normal mood  and affect. Her behavior is normal. Judgment and thought content normal.   Vitals reviewed.      WBC   Date Value Ref Range Status   05/02/2017 4.78 4.00 - 10.00 10*3/mm3 Final     RBC   Date Value Ref Range Status   05/02/2017 4.42 3.90 - 5.00 10*6/mm3 Final     Hemoglobin   Date Value Ref Range Status   05/02/2017 12.9 11.5 - 14.9 g/dL Final     Hematocrit   Date Value Ref Range Status   05/02/2017 40.5 34.0 - 45.0 % Final     MCV   Date Value Ref Range Status   05/02/2017 91.6 83.0 - 97.0 fL Final     MCH   Date Value Ref Range Status   05/02/2017 29.2 27.0 - 33.0 pg Final     MCHC   Date Value Ref Range Status   05/02/2017 31.9 (L) 32.0 - 35.0 g/dL Final     RDW   Date Value Ref Range Status   05/02/2017 13.8 11.7 - 14.5 % Final     RDW-SD   Date Value Ref Range Status   05/02/2017 47.1 37.0 - 49.0 fl Final     MPV   Date Value Ref Range Status   05/02/2017 10.1 8.9 - 12.1 fL Final     Platelets   Date Value Ref Range Status   05/02/2017 194 150 - 375 10*3/mm3 Final     Neutrophil %   Date Value Ref Range Status   05/02/2017 63.4 39.0 - 75.0 % Final     Lymphocyte %   Date Value Ref Range Status   05/02/2017 20.1 20.0 - 49.0 % Final     Monocyte %   Date Value Ref Range Status   05/02/2017 10.9 4.0 - 12.0 % Final     Eosinophil %   Date Value Ref Range Status   05/02/2017 4.2 1.0 - 5.0 % Final     Basophil %   Date Value Ref Range Status   05/02/2017 0.8 0.0 - 1.1 % Final     Immature Grans %   Date Value Ref Range Status   05/02/2017 0.6 (H) 0.0 - 0.5 % Final     Neutrophils, Absolute   Date Value Ref Range Status   05/02/2017 3.03 1.50 - 7.00 10*3/mm3 Final     Lymphocytes, Absolute   Date Value Ref Range Status   05/02/2017 0.96 (L) 1.00 - 3.50 10*3/mm3 Final     Monocytes, Absolute   Date Value Ref Range Status   05/02/2017 0.52 0.25 - 0.80 10*3/mm3 Final     Eosinophils, Absolute   Date Value Ref Range Status   05/02/2017 0.20 0.00 - 0.36 10*3/mm3 Final     Basophils, Absolute   Date Value Ref Range Status    05/02/2017 0.04 0.00 - 0.10 10*3/mm3 Final     Immature Grans, Absolute   Date Value Ref Range Status   05/02/2017 0.03 0.00 - 0.03 10*3/mm3 Final     nRBC   Date Value Ref Range Status   05/02/2017 0.0 0.0 - 0.0 /100 WBC Final       Lab Results   Component Value Date    PROTENTOTREF 6.1 05/19/2016    PROTENTOTREF 6.1 05/19/2016    ALBUMIN 3.4 05/19/2016    ALBUMIN 3.4 05/19/2016    LABIL2 1.3 05/19/2016    LABIL2 1.3 05/19/2016         Imaging Results (last 7 days)     ** No results found for the last 168 hours. **            Assessment/Plan    1. Dysphagia: Multiple areas of her esophagus to cause dysphagia including the upper esophagus at the site of the cricopharyngeus, at the site of the shot skis ring, and at the site of the hiatal hernia.  It does not appear that any diameter is less than 1.6 cm per the esophagram reports.  She is denying any current issues with swallowing difficulty.  2. Silent aspiration: documented on esophagram; she is not having any frequent respiratory infections from this  3. GERD: stable on pantoprazole  4. Leg numbness: resolved but she did not notify her pcp    Plan  -I discussed the risks of persistent aspiration with the patient and her daughter.  I do recommend an evaluation by speech therapy to determine the severity of the aspiration and for any recommendations to help her with swallowing.  Given that she is not having significant issues with food passage in her esophagus, I do not feel that an EGD with potential dilation is warranted at this time.  Additionally I don't know that she would be a good candidate for any surgical intervention of the hiatal hernia.  She seems fairly asymptomatic from it.  Her daughter does request that the speech therapist be at [facility] as that is closer for her.  I will have my nurse's arrange this for her.  -I will notify Dr. Agosto's office about her recent episode of leg numbness.  That this should be investigated and I  expressed this to the patient and her daughter.    Randi was seen today for diarrhea, heartburn and difficulty swallowing.    Diagnoses and all orders for this visit:    Pharyngoesophageal dysphagia    Aspiration into airway, initial encounter    Gastroesophageal reflux disease, esophagitis presence not specified        Dictated utilizing Dragon dictation

## 2017-08-18 NOTE — TELEPHONE ENCOUNTER
Hydrochlorothiazide refill denied.  Per Dr. Ceja's dictation on 5-2, he refilled it for her because she ran out but was told to get from PCP from here on out.  It is for management of B/P and we do not manage.

## 2017-08-18 NOTE — PATIENT INSTRUCTIONS
Continue the pantoprazole    Continue to be careful with eating and drinking    Will refer to speech therapy at [facility]    Follow up with Dr Agosto regarding recent leg numbness    For any additional questions, concerns or changes to your condition after today's office visit please contact the office at [phone].

## 2017-08-21 ENCOUNTER — TELEPHONE (OUTPATIENT)
Dept: GASTROENTEROLOGY | Facility: CLINIC | Age: 82
End: 2017-08-21

## 2017-08-21 DIAGNOSIS — R13.10 DYSPHAGIA, UNSPECIFIED TYPE: Primary | ICD-10-CM

## 2017-08-21 NOTE — TELEPHONE ENCOUNTER
Call to Dr Thony Agosto's office @ [phone] and spoke with Ruchi.  Report that pt had episode of bilateral lower extremity numbness a week and a half ago.  Has resolved, but needs f/u.  Appt made for pt on 8/24/17 @ 7620.  Ruchi also requests last o/v note - faxed via [employer].    Call to pt to advise of above.  Pt verb understanding and accepts appt.  Advise pt that will be receiving appt for VFSS    Order placed for VFSS - message to JOSSY Mayo.

## 2017-08-21 NOTE — TELEPHONE ENCOUNTER
----- Message from Rosalie Perez MD sent at 8/18/2017  5:20 PM EDT -----  Need to things on her:  #1: Every week contact Dr. Smith office regarding an episode where she had bilateral lower extremity numbness.  This was about a week and a half ago.  It is since resolved but she has not had any follow-up for this  #2: Can we get her referred to speech therapy for a VFSS and relation and treatment.  She has significant laryngeal penetration and silent aspiration identified on her recent esophagram test    Thank you

## 2017-08-21 NOTE — TELEPHONE ENCOUNTER
I am signing this order for Dr. Rosalie Perez who is out of the office this week.  Once results are ready for review they will be forwarded to Dr. Perez for further evaluation and pt treatment plan.

## 2017-08-22 ENCOUNTER — LAB (OUTPATIENT)
Dept: LAB | Facility: HOSPITAL | Age: 82
End: 2017-08-22

## 2017-08-22 ENCOUNTER — APPOINTMENT (OUTPATIENT)
Dept: ONCOLOGY | Facility: CLINIC | Age: 82
End: 2017-08-22

## 2017-08-22 ENCOUNTER — APPOINTMENT (OUTPATIENT)
Dept: LAB | Facility: HOSPITAL | Age: 82
End: 2017-08-22

## 2017-08-22 ENCOUNTER — OFFICE VISIT (OUTPATIENT)
Dept: ONCOLOGY | Facility: CLINIC | Age: 82
End: 2017-08-22

## 2017-08-22 VITALS
SYSTOLIC BLOOD PRESSURE: 114 MMHG | DIASTOLIC BLOOD PRESSURE: 66 MMHG | HEIGHT: 63 IN | TEMPERATURE: 97.8 F | WEIGHT: 152.6 LBS | RESPIRATION RATE: 16 BRPM | OXYGEN SATURATION: 99 % | BODY MASS INDEX: 27.04 KG/M2 | HEART RATE: 80 BPM

## 2017-08-22 DIAGNOSIS — D47.2 MONOCLONAL GAMMOPATHY: ICD-10-CM

## 2017-08-22 DIAGNOSIS — D50.9 IRON DEFICIENCY ANEMIA, UNSPECIFIED IRON DEFICIENCY ANEMIA TYPE: ICD-10-CM

## 2017-08-22 DIAGNOSIS — R20.0 NUMBNESS IN BOTH LEGS: ICD-10-CM

## 2017-08-22 DIAGNOSIS — D50.9 IRON DEFICIENCY ANEMIA, UNSPECIFIED IRON DEFICIENCY ANEMIA TYPE: Primary | ICD-10-CM

## 2017-08-22 PROBLEM — K90.9 MALABSORPTION OF IRON: Status: ACTIVE | Noted: 2017-08-22

## 2017-08-22 LAB
BASOPHILS # BLD AUTO: 0.02 10*3/MM3 (ref 0–0.1)
BASOPHILS NFR BLD AUTO: 0.4 % (ref 0–1.1)
DEPRECATED RDW RBC AUTO: 44.1 FL (ref 37–49)
EOSINOPHIL # BLD AUTO: 0.14 10*3/MM3 (ref 0–0.36)
EOSINOPHIL NFR BLD AUTO: 3 % (ref 1–5)
ERYTHROCYTE [DISTWIDTH] IN BLOOD BY AUTOMATED COUNT: 13.2 % (ref 11.7–14.5)
FERRITIN SERPL-MCNC: 15.8 NG/ML
HCT VFR BLD AUTO: 37.1 % (ref 34–45)
HGB BLD-MCNC: 11.9 G/DL (ref 11.5–14.9)
HOLD SPECIMEN: NORMAL
IMM GRANULOCYTES # BLD: 0.03 10*3/MM3 (ref 0–0.03)
IMM GRANULOCYTES NFR BLD: 0.6 % (ref 0–0.5)
IRON 24H UR-MRATE: 45 MCG/DL (ref 37–145)
IRON SATN MFR SERPL: 11 % (ref 14–48)
LYMPHOCYTES # BLD AUTO: 0.9 10*3/MM3 (ref 1–3.5)
LYMPHOCYTES NFR BLD AUTO: 19.1 % (ref 20–49)
MCH RBC QN AUTO: 29.3 PG (ref 27–33)
MCHC RBC AUTO-ENTMCNC: 32.1 G/DL (ref 32–35)
MCV RBC AUTO: 91.4 FL (ref 83–97)
MONOCYTES # BLD AUTO: 0.48 10*3/MM3 (ref 0.25–0.8)
MONOCYTES NFR BLD AUTO: 10.2 % (ref 4–12)
NEUTROPHILS # BLD AUTO: 3.14 10*3/MM3 (ref 1.5–7)
NEUTROPHILS NFR BLD AUTO: 66.7 % (ref 39–75)
NRBC BLD MANUAL-RTO: 0 /100 WBC (ref 0–0)
PLATELET # BLD AUTO: 173 10*3/MM3 (ref 150–375)
PMV BLD AUTO: 10.2 FL (ref 8.9–12.1)
RBC # BLD AUTO: 4.06 10*6/MM3 (ref 3.9–5)
TIBC SERPL-MCNC: 412 MCG/DL (ref 249–505)
TRANSFERRIN SERPL-MCNC: 294 MG/DL (ref 200–360)
WBC NRBC COR # BLD: 4.71 10*3/MM3 (ref 4–10)

## 2017-08-22 PROCEDURE — 36415 COLL VENOUS BLD VENIPUNCTURE: CPT | Performed by: INTERNAL MEDICINE

## 2017-08-22 PROCEDURE — 99214 OFFICE O/P EST MOD 30 MIN: CPT | Performed by: INTERNAL MEDICINE

## 2017-08-22 PROCEDURE — 82607 VITAMIN B-12: CPT | Performed by: INTERNAL MEDICINE

## 2017-08-22 PROCEDURE — 83735 ASSAY OF MAGNESIUM: CPT | Performed by: INTERNAL MEDICINE

## 2017-08-22 PROCEDURE — 82728 ASSAY OF FERRITIN: CPT | Performed by: INTERNAL MEDICINE

## 2017-08-22 PROCEDURE — 84466 ASSAY OF TRANSFERRIN: CPT | Performed by: INTERNAL MEDICINE

## 2017-08-22 PROCEDURE — 82746 ASSAY OF FOLIC ACID SERUM: CPT | Performed by: INTERNAL MEDICINE

## 2017-08-22 PROCEDURE — 80053 COMPREHEN METABOLIC PANEL: CPT | Performed by: INTERNAL MEDICINE

## 2017-08-22 PROCEDURE — 85025 COMPLETE CBC W/AUTO DIFF WBC: CPT | Performed by: INTERNAL MEDICINE

## 2017-08-22 PROCEDURE — 83540 ASSAY OF IRON: CPT | Performed by: INTERNAL MEDICINE

## 2017-08-22 RX ORDER — DIPHENHYDRAMINE HCL 25 MG
25 CAPSULE ORAL ONCE
Status: CANCELLED | OUTPATIENT
Start: 2017-09-06

## 2017-08-22 RX ORDER — FAMOTIDINE 10 MG/ML
20 INJECTION, SOLUTION INTRAVENOUS ONCE
Status: CANCELLED | OUTPATIENT
Start: 2017-09-06

## 2017-08-22 RX ORDER — SODIUM CHLORIDE 9 MG/ML
250 INJECTION, SOLUTION INTRAVENOUS ONCE
Status: CANCELLED | OUTPATIENT
Start: 2017-09-06

## 2017-08-22 RX ORDER — DIPHENHYDRAMINE HCL 25 MG
25 CAPSULE ORAL ONCE
Status: CANCELLED | OUTPATIENT
Start: 2017-08-30

## 2017-08-22 RX ORDER — SODIUM CHLORIDE 9 MG/ML
250 INJECTION, SOLUTION INTRAVENOUS ONCE
Status: CANCELLED | OUTPATIENT
Start: 2017-08-30

## 2017-08-22 RX ORDER — FAMOTIDINE 10 MG/ML
20 INJECTION, SOLUTION INTRAVENOUS ONCE
Status: CANCELLED | OUTPATIENT
Start: 2017-08-30

## 2017-08-22 NOTE — PROGRESS NOTES
Subjective     CHIEF COMPLAINT:      1. Monoclonal gammopathy of undetermined significance.   2. Anemia.     HISTORY OF PRESENT ILLNESS:     Randi Mera is an 89 y.o. patient who returns today for follow-up accompanied by her son.  She was seen by gastroenterology for her dysphagia and she was referred to speech therapy.    At the patient's last visit in May 2017, she indicated that she was tolerating the oral iron.  However, she started developing upset stomach and was not able to continue it.  Her hemoglobin has decreased since May 2017.  She is not noticing blood in the stool.    Patient reports developing numbness of the lower extremities that developed about a week ago.  She was advised by her gastroenterologist to see her primary care physician regarding this and she will see Dr. Gonsalez later this week.      Past Medical History:   Diagnosis Date   • Anemia    • Degenerative arthritis    • Fatigue    • GERD (gastroesophageal reflux disease)    • Hiatal hernia    • Hypertension    • Monoclonal gammopathy of undetermined significance    • Rheumatic fever     AS A CHILD   • Schatzki's ring    • Ulcer    • Zenker diverticulum        Past Surgical History:   Procedure Laterality Date   • CHOLECYSTECTOMY  2008   • COLONOSCOPY  approx 2012    normal per patient  [facility]   • HYSTERECTOMY     • REPLACEMENT TOTAL KNEE Right 2000       Cancer-related family history includes Cancer in her brother and brother.    SCHEDULED MEDS:    Current Outpatient Prescriptions:   •  amLODIPine (NORVASC) 10 MG tablet, Take 10 mg by mouth daily., Disp: , Rfl:   •  aspirin 81 MG EC tablet, Take 81 mg by mouth daily., Disp: , Rfl:   •  escitalopram (LEXAPRO) 10 MG tablet, Take 10 mg by mouth daily., Disp: , Rfl:   •  methylcellulose, Laxative, (CITRUCEL) 500 MG tablet tablet, Take 2 tablets by mouth 2 (Two) Times a Day With Meals., Disp: 120 tablet, Rfl: 3  •  Multiple Vitamins-Minerals (CENTRUM SILVER PO), Take  by mouth Daily.,  "Disp: , Rfl:   •  Multiple Vitamins-Minerals (OCUVITE PO), Take  by mouth Daily., Disp: , Rfl:   •  pantoprazole (PROTONIX) 40 MG EC tablet, TAKE ONE TABLET BY MOUTH EVERY DAY, Disp: , Rfl: 5  •  SYMBICORT 160-4.5 MCG/ACT inhaler, TWO PUFFS EVERY TWELVE HOURS RINSE MOUTH AFTER EACH USE., Disp: , Rfl: 11  •  VESICARE 5 MG tablet, TAKE ONE TABLET BY MOUTH DAILY, Disp: , Rfl: 0    REVIEW OF SYSTEMS:  GENERAL:  Fatigue.   SKIN:  No rashes or lesions.  HEME/LYMPH: Anemia has worsened.  No bruising or bleeding.  RESPIRATORY:  No cough, shortness of breath, hemoptysis or wheezing.  CVS:  No chest pain, palpitations or lower extremity swelling.  GI:  Dysphagia.  :  No dysuria, hematuria or increased frequency.   MUSCULOSKELETAL:  No bone pain or joint stiffness.  NEUROLOGICAL:  Intermittent lower extremity numbness.      Objective   VITAL SIGNS:  Vitals:    08/22/17 1613   BP: 114/66   Pulse: 80   Resp: 16   Temp: 97.8 °F (36.6 °C)   TempSrc: Oral   SpO2: 99%   Weight: 152 lb 9.6 oz (69.2 kg)   Height: 63.39\" (161 cm)   PainSc: 0-No pain       Wt Readings from Last 3 Encounters:   08/22/17 152 lb 9.6 oz (69.2 kg)   08/18/17 148 lb 6.4 oz (67.3 kg)   07/05/17 146 lb 12.8 oz (66.6 kg)       PHYSICAL EXAMINATION:  GENERAL:  The patient appears in good general condition, not in acute distress.  SKIN:  No skin rashes or lesions. No ecchymosis or petechiae.  HEAD:  Normocephalic.  EYES:  No jaundice. No Pallor.   LYMPHATICS:  No cervical or supraclavicular lymphadenopathy.  CHEST: Clear bilaterally.  No added sounds.  ABDOMEN:  Soft and non-tender. No hepato- or splenomegaly. No masses.  EXTREMITIES:  No cyanosis or edema.   NEURO: No focal abnormality.  .  RESULT REVIEW:       Results from last 7 days  Lab Units 08/22/17  1554   WBC 10*3/mm3 4.71   NEUTROS ABS 10*3/mm3 3.14   HEMOGLOBIN g/dL 11.9   HEMATOCRIT % 37.1   PLATELETS 10*3/mm3 173       Lab Results   Component Value Date    FERRITIN 15.80 08/22/2017    FERRITIN 50.10 " 05/02/2017    FERRITIN 12.60 01/10/2017    IRON 45 08/22/2017    IRON 71 05/02/2017    IRON 154 (H) 01/10/2017    TIBC 412 08/22/2017    TIBC 358 05/02/2017    TIBC 437 01/10/2017        Assessment/Plan    1.  Iron deficiency anemia.  She is not tolerating oral iron with development of upset stomach.  She was also not absorbing the iron in the multivitamin. Therefore, she received IV Feraheme on 1/12/17 and 1/19/17 and she responded well with improvement in her hemoglobin from 10.5-12.9 in May 2017.  However, hemoglobin dropped to 11.9 and iron stores decreased to a ferritin of 15.  She is symptomatic with fatigue.  I recommended IV Feraheme ×2 doses.  They will be scheduled to be given on 8/30/17 and 9/6/17.    2.  Dysphagia.  She is now following up with gastroenterology.     3.  Numbness in the lower extremities.  Has been intermittent for the past week.  She does have history of vitamin B12 deficiency.  I will obtain vitamin B12 level and I will also obtain CMP and magnesium level and we will forward the results to Dr. Gonsalez.  Patient will see Dr. Gonsalez later this week.    4.  Monoclonal gammopathy of undetermined significance. M protein was 0.5 in May 2016.  We will repeat paraprotein in 6 months.    We will schedule the patient for repeat CBC and iron studies in 3 months and we will see the patient in follow-up in 6 months with a CBC, ferritin, iron panel and paraprotein studies at her return visit.      Nadir Ceja MD  08/22/17

## 2017-08-23 LAB
ALBUMIN SERPL-MCNC: 3.6 G/DL (ref 2.9–4.4)
ALBUMIN SERPL-MCNC: 3.9 G/DL (ref 3.5–5.2)
ALBUMIN/GLOB SERPL: 1.2 {RATIO} (ref 0.7–1.7)
ALBUMIN/GLOB SERPL: 1.3 G/DL (ref 1.1–2.4)
ALP SERPL-CCNC: 78 U/L (ref 38–116)
ALPHA1 GLOB FLD ELPH-MCNC: 0.2 G/DL (ref 0–0.4)
ALPHA2 GLOB SERPL ELPH-MCNC: 0.7 G/DL (ref 0.4–1)
ALT SERPL W P-5'-P-CCNC: 11 U/L (ref 0–33)
ANION GAP SERPL CALCULATED.3IONS-SCNC: 16.4 MMOL/L
AST SERPL-CCNC: 17 U/L (ref 0–32)
B-GLOBULIN SERPL ELPH-MCNC: 1 G/DL (ref 0.7–1.3)
BILIRUB SERPL-MCNC: 0.2 MG/DL (ref 0.1–1.2)
BUN BLD-MCNC: 24 MG/DL (ref 6–20)
BUN/CREAT SERPL: 22.6 (ref 7.3–30)
CALCIUM SPEC-SCNC: 8.8 MG/DL (ref 8.5–10.2)
CHLORIDE SERPL-SCNC: 102 MMOL/L (ref 98–107)
CO2 SERPL-SCNC: 22.6 MMOL/L (ref 22–29)
CREAT BLD-MCNC: 1.06 MG/DL (ref 0.6–1.1)
FOLATE SERPL-MCNC: 14.66 NG/ML (ref 4.78–24.2)
GAMMA GLOB SERPL ELPH-MCNC: 1.2 G/DL (ref 0.4–1.8)
GFR SERPL CREATININE-BSD FRML MDRD: 49 ML/MIN/1.73
GLOBULIN SER CALC-MCNC: 3.2 G/DL (ref 2.2–3.9)
GLOBULIN UR ELPH-MCNC: 3 GM/DL (ref 1.8–3.5)
GLUCOSE BLD-MCNC: 93 MG/DL (ref 74–124)
IGA SERPL-MCNC: 148 MG/DL (ref 64–422)
IGG SERPL-MCNC: 1107 MG/DL (ref 700–1600)
IGM SERPL-MCNC: 54 MG/DL (ref 26–217)
INTERPRETATION SERPL IEP-IMP: ABNORMAL
KAPPA LC SERPL-MCNC: 30.4 MG/L (ref 3.3–19.4)
KAPPA LC/LAMBDA SER: 0.76 {RATIO} (ref 0.26–1.65)
LAMBDA LC FREE SERPL-MCNC: 40.2 MG/L (ref 5.7–26.3)
Lab: ABNORMAL
M-SPIKE: 0.7 G/DL
MAGNESIUM SERPL-MCNC: 1.7 MG/DL (ref 1.8–2.5)
POTASSIUM BLD-SCNC: 4 MMOL/L (ref 3.5–4.7)
PROT SERPL-MCNC: 6.8 G/DL (ref 6–8.5)
PROT SERPL-MCNC: 6.9 G/DL (ref 6.3–8)
SODIUM BLD-SCNC: 141 MMOL/L (ref 134–145)
VIT B12 BLD-MCNC: 399 PG/ML (ref 211–946)

## 2017-08-30 ENCOUNTER — INFUSION (OUTPATIENT)
Dept: ONCOLOGY | Facility: HOSPITAL | Age: 82
End: 2017-08-30

## 2017-08-30 VITALS
TEMPERATURE: 98 F | OXYGEN SATURATION: 97 % | HEART RATE: 81 BPM | SYSTOLIC BLOOD PRESSURE: 144 MMHG | WEIGHT: 151.6 LBS | BODY MASS INDEX: 26.53 KG/M2 | DIASTOLIC BLOOD PRESSURE: 86 MMHG

## 2017-08-30 DIAGNOSIS — K90.9 MALABSORPTION OF IRON: Primary | ICD-10-CM

## 2017-08-30 DIAGNOSIS — D50.9 IRON DEFICIENCY ANEMIA, UNSPECIFIED IRON DEFICIENCY ANEMIA TYPE: ICD-10-CM

## 2017-08-30 PROCEDURE — 96374 THER/PROPH/DIAG INJ IV PUSH: CPT | Performed by: INTERNAL MEDICINE

## 2017-08-30 PROCEDURE — 96375 TX/PRO/DX INJ NEW DRUG ADDON: CPT | Performed by: INTERNAL MEDICINE

## 2017-08-30 PROCEDURE — 25010000002 FERUMOXYTOL 510 MG/17ML SOLUTION 510 MG VIAL: Performed by: INTERNAL MEDICINE

## 2017-08-30 PROCEDURE — 63710000001 DIPHENHYDRAMINE PER 50 MG: Performed by: INTERNAL MEDICINE

## 2017-08-30 RX ORDER — DIPHENHYDRAMINE HCL 25 MG
25 CAPSULE ORAL ONCE
Status: COMPLETED | OUTPATIENT
Start: 2017-08-30 | End: 2017-08-30

## 2017-08-30 RX ORDER — SODIUM CHLORIDE 9 MG/ML
250 INJECTION, SOLUTION INTRAVENOUS ONCE
Status: COMPLETED | OUTPATIENT
Start: 2017-08-30 | End: 2017-08-30

## 2017-08-30 RX ORDER — FAMOTIDINE 10 MG/ML
20 INJECTION, SOLUTION INTRAVENOUS ONCE
Status: COMPLETED | OUTPATIENT
Start: 2017-08-30 | End: 2017-08-30

## 2017-08-30 RX ADMIN — DIPHENHYDRAMINE HYDROCHLORIDE 25 MG: 25 CAPSULE ORAL at 13:07

## 2017-08-30 RX ADMIN — SODIUM CHLORIDE 250 ML: 900 INJECTION, SOLUTION INTRAVENOUS at 13:06

## 2017-08-30 RX ADMIN — FERUMOXYTOL 510 MG: 510 INJECTION INTRAVENOUS at 13:42

## 2017-08-30 RX ADMIN — FAMOTIDINE 20 MG: 10 INJECTION, SOLUTION INTRAVENOUS at 13:07

## 2017-08-30 NOTE — TELEPHONE ENCOUNTER
Call from Mychal in  [facility].  States needs order for swallow study - VFSS order in system does not cover swallow study.  Written order faxed to [phone] - confirmation received.

## 2017-08-31 ENCOUNTER — TRANSCRIBE ORDERS (OUTPATIENT)
Dept: ADMINISTRATIVE | Facility: HOSPITAL | Age: 82
End: 2017-08-31

## 2017-08-31 DIAGNOSIS — R13.11 ORAL PHASE DYSPHAGIA: Primary | ICD-10-CM

## 2017-08-31 DIAGNOSIS — R13.19 OTHER DYSPHAGIA: Primary | ICD-10-CM

## 2017-09-06 ENCOUNTER — INFUSION (OUTPATIENT)
Dept: ONCOLOGY | Facility: HOSPITAL | Age: 82
End: 2017-09-06

## 2017-09-06 ENCOUNTER — HOSPITAL ENCOUNTER (OUTPATIENT)
Dept: GENERAL RADIOLOGY | Facility: HOSPITAL | Age: 82
Discharge: HOME | End: 2017-09-06
Attending: INTERNAL MEDICINE | Admitting: INTERNAL MEDICINE

## 2017-09-06 VITALS
WEIGHT: 152.6 LBS | SYSTOLIC BLOOD PRESSURE: 137 MMHG | TEMPERATURE: 98 F | DIASTOLIC BLOOD PRESSURE: 85 MMHG | BODY MASS INDEX: 26.7 KG/M2 | HEART RATE: 78 BPM

## 2017-09-06 DIAGNOSIS — K90.9 MALABSORPTION OF IRON: Primary | ICD-10-CM

## 2017-09-06 DIAGNOSIS — R13.19 OTHER DYSPHAGIA: ICD-10-CM

## 2017-09-06 DIAGNOSIS — D50.9 IRON DEFICIENCY ANEMIA, UNSPECIFIED IRON DEFICIENCY ANEMIA TYPE: ICD-10-CM

## 2017-09-06 PROCEDURE — G8997 SWALLOW GOAL STATUS: HCPCS

## 2017-09-06 PROCEDURE — 25010000002 FERUMOXYTOL 510 MG/17ML SOLUTION 510 MG VIAL: Performed by: INTERNAL MEDICINE

## 2017-09-06 PROCEDURE — G8998 SWALLOW D/C STATUS: HCPCS

## 2017-09-06 PROCEDURE — 63710000001 DIPHENHYDRAMINE PER 50 MG: Performed by: INTERNAL MEDICINE

## 2017-09-06 PROCEDURE — 96374 THER/PROPH/DIAG INJ IV PUSH: CPT

## 2017-09-06 PROCEDURE — 92611 MOTION FLUOROSCOPY/SWALLOW: CPT

## 2017-09-06 PROCEDURE — G8996 SWALLOW CURRENT STATUS: HCPCS

## 2017-09-06 PROCEDURE — 74230 X-RAY XM SWLNG FUNCJ C+: CPT

## 2017-09-06 PROCEDURE — 96375 TX/PRO/DX INJ NEW DRUG ADDON: CPT

## 2017-09-06 RX ORDER — FAMOTIDINE 10 MG/ML
20 INJECTION, SOLUTION INTRAVENOUS ONCE
Status: COMPLETED | OUTPATIENT
Start: 2017-09-06 | End: 2017-09-06

## 2017-09-06 RX ORDER — DIPHENHYDRAMINE HCL 25 MG
25 CAPSULE ORAL ONCE
Status: COMPLETED | OUTPATIENT
Start: 2017-09-06 | End: 2017-09-06

## 2017-09-06 RX ORDER — SODIUM CHLORIDE 9 MG/ML
250 INJECTION, SOLUTION INTRAVENOUS ONCE
Status: COMPLETED | OUTPATIENT
Start: 2017-09-06 | End: 2017-09-06

## 2017-09-06 RX ADMIN — FERUMOXYTOL 510 MG: 510 INJECTION INTRAVENOUS at 13:30

## 2017-09-06 RX ADMIN — FAMOTIDINE 20 MG: 10 INJECTION, SOLUTION INTRAVENOUS at 13:08

## 2017-09-06 RX ADMIN — DIPHENHYDRAMINE HYDROCHLORIDE 25 MG: 25 CAPSULE ORAL at 13:09

## 2017-09-06 RX ADMIN — SODIUM CHLORIDE 250 ML: 900 INJECTION, SOLUTION INTRAVENOUS at 13:03

## 2017-09-06 NOTE — MBS/VFSS/FEES
Outpatient Speech Language Pathology   Adult Swallow Initial Evaluation VFSS  [facility]     Patient Name: Randi Mera  : 1927  MRN: [id number]  Today's Date: 2017         Visit Date: 2017   Patient Active Problem List   Diagnosis   • Monoclonal gammopathy   • Iron deficiency anemia   • Dysphagia   • Essential hypertension   • Malabsorption of iron        Past Medical History:   Diagnosis Date   • Anemia    • Degenerative arthritis    • Fatigue    • GERD (gastroesophageal reflux disease)    • Hiatal hernia    • Hypertension    • Monoclonal gammopathy of undetermined significance    • Rheumatic fever     AS A CHILD   • Schatzki's ring    • Ulcer    • Zenker diverticulum         Past Surgical History:   Procedure Laterality Date   • CHOLECYSTECTOMY     • COLONOSCOPY  approx     normal per patient  El HAYES   • HYSTERECTOMY     • REPLACEMENT TOTAL KNEE Right          Visit Dx:     ICD-10-CM ICD-9-CM   1. Other dysphagia R13.19 787.29        SPEECH-LANGUAGE PATHOLOGY EVALUTION - VFSS  Subjective: The patient was seen on this date for a VFSS(Videofluoroscopic Swallowing Study).  Patient was alert and cooperative.    The patient's history is significant for silent penetration and aspiration on esophagram 2017, esophageal dysphagia.  Objective: Risks/benefits were reviewed with the patient and family, and consent was obtained. The study was completed with SLP present and Radiologist review. The patient was seen in lateral view(s). Textures given included thin liquid, nectar thick liquid, puree consistency, mechanical soft consistency and regular consistency.  Assessment: Difficulties were noted with none of the above consistencies. Pt presents with functional swallow. Inconsistent transient penetration observed. No aspiration this date with thin, nectar thick, puree, mech soft, and regular textures.     SLP Findings: Patient presents with functional swallow.   Comments: Discussed  results with pt and pt's daughter. Recommend reduced distractions with PO intake. SLP provided extensive education regarding swallow function and dysphagia.  Recommendations: Diet Textures: thin liquid, regular consistency food. Medications should be taken whole with thin liquids or puree.   Recommended Strategies: reduce distractions, Upright for PO and small bites and sips. Oral care before breakfast, after all meals and PRN.  Other Recommended Evaluations:     Dysphagia therapy is not recommended. Rationale: Not warranted at this time.            Adult Dysphagia - 09/06/17 1115     Adult Dysphagia Background    Patient Description of Complaint No complaint per pt  -OC    Date of Onset 07/2017 aspiration on esophagram  -OC    Current Diet (Solids) Regular  -OC    Diet Level (Liquids) Thin Liquid  -OC    Oral Mech    Anatomy/Physiology WNL Patient demonstrates anatomy that is WNL  -OC    Dentition Patient has own teeth  -OC    Oral Health Oral cavity is clean  -OC    Awareness/Control of Secretions Patient swallows saliva  -OC    SLP Communication to Radiology    Severity Level of Dysphagia other (see comments)   functional swallow  -OC    Summary Statement Continued Pt presents with functional swallow. Inconsistent transient penetration observed. No aspiration observed this date with thin, nectar thick, puree, mech soft, and regular textures.   -OC      User Key  (r) = Recorded By, (t) = Taken By, (c) = Cosigned By    Initials Name Provider Type    OC Kalli Medrano MA,CCC-SLP Speech and Language Pathologist                            OP SLP Education       09/06/17 1115    Education    Barriers to Learning No barriers identified  -OC    Education Provided Described results of evaluation;Patient expressed understanding of evaluation;Family/caregivers expressed understanding of evaluation  -OC    Assessed Learning needs;Learning motivation;Learning preferences;Learning readiness  -OC    Learning Motivation Strong   -OC    Learning Method Explanation  -OC    Teaching Response Verbalized understanding  -OC      User Key  (r) = Recorded By, (t) = Taken By, (c) = Cosigned By    Initials Name Effective Dates    OC Kalli Medrano MA, CCC-SLP 04/05/17 -                     OP SLP Assessment/Plan - 09/06/17 1602     SLP Assessment    Functional Problems Swallowing  -OC    Clinical Impression: Swallowing WNL  -OC    Please refer to paper survey for additional self-reported information No  -OC    Please refer to items scanned into chart for additional diagnostic informaiton and handouts as provided by clinician No  -OC    Prognosis Excellent (comment)  -OC    Patient/caregiver participated in establishment of treatment plan and goals Yes  -OC    Patient would benefit from skilled therapy intervention No  -OC    SLP Plan    Planned CPT's? SLP MBS: [id number]  -OC      User Key  (r) = Recorded By, (t) = Taken By, (c) = Cosigned By    Initials Name Provider Type    OC Kalli Medrano MA, CCC-SLP Speech and Language Pathologist              SLP Outcome Measures (last 72 hours)      SLP Outcome Measures       09/06/17 1115          SLP Outcome Measures    Outcome Measure Used? Adult NOMS  -OC      FCM Scores    FCM Chosen Swallowing  -OC      Swallowing FCM Score 6  -OC        User Key  (r) = Recorded By, (t) = Taken By, (c) = Cosigned By    Initials Name Effective Dates    OC Kalli Medrano MA, CCC-ANTONETTE 04/05/17 -                Time Calculation:   SLP Start Time: 1015  SLP Stop Time: 1145  SLP Time Calculation (min): 90 min    Therapy Charges for Today     Code Description Service Date Service Provider Modifiers Qty    24137228694 HC ST SWALLOWING CURRENT STATUS 9/6/2017 Kalli Medrano MA, CCC-SLP GN, CI 1    30443592425 HC ST SWALLOWING PROJECTED 9/6/2017 Kalli Medrano MA, CCC-SLP GN, CI 1    16805086907 HC ST SWALLOWING DISCHARGE 9/6/2017 Kalli Medrano MA, CCC-SLP GN, CI 1    76875377763 HC ST MOTION FLUORO EVAL SWALLOW 6 9/6/2017 Kalli Medrano MA, CCC-SLP GN,  KX 1          SLP G-Codes  SLP NOMS Used?: Yes  Functional Limitations: Swallowing  Swallow Current Status (): At least 1 percent but less than 20 percent impaired, limited or restricted  Swallow Goal Status (): At least 1 percent but less than 20 percent impaired, limited or restricted  Swallow Discharge Status (): At least 1 percent but less than 20 percent impaired, limited or restricted        Kalli Medrano MA,CCC-SLP  9/6/2017

## 2017-09-14 RX ORDER — HYDROCHLOROTHIAZIDE 25 MG/1
TABLET ORAL
Qty: 30 TABLET | OUTPATIENT
Start: 2017-09-14

## 2017-09-18 ENCOUNTER — DOCUMENTATION (OUTPATIENT)
Dept: ONCOLOGY | Facility: HOSPITAL | Age: 82
End: 2017-09-18

## 2017-11-15 ENCOUNTER — CLINICAL SUPPORT (OUTPATIENT)
Dept: ONCOLOGY | Facility: HOSPITAL | Age: 82
End: 2017-11-15

## 2017-11-15 ENCOUNTER — LAB (OUTPATIENT)
Dept: LAB | Facility: HOSPITAL | Age: 82
End: 2017-11-15

## 2017-11-15 DIAGNOSIS — D50.9 IRON DEFICIENCY ANEMIA, UNSPECIFIED IRON DEFICIENCY ANEMIA TYPE: ICD-10-CM

## 2017-11-15 LAB
BASOPHILS # BLD AUTO: 0.02 10*3/MM3 (ref 0–0.1)
BASOPHILS NFR BLD AUTO: 0.3 % (ref 0–1.1)
DEPRECATED RDW RBC AUTO: 44.2 FL (ref 37–49)
EOSINOPHIL # BLD AUTO: 0.08 10*3/MM3 (ref 0–0.36)
EOSINOPHIL NFR BLD AUTO: 1 % (ref 1–5)
ERYTHROCYTE [DISTWIDTH] IN BLOOD BY AUTOMATED COUNT: 13.1 % (ref 11.7–14.5)
FERRITIN SERPL-MCNC: 172 NG/ML
HCT VFR BLD AUTO: 39.9 % (ref 34–45)
HGB BLD-MCNC: 13.2 G/DL (ref 11.5–14.9)
IMM GRANULOCYTES # BLD: 0.1 10*3/MM3 (ref 0–0.03)
IMM GRANULOCYTES NFR BLD: 1.3 % (ref 0–0.5)
IRON 24H UR-MRATE: 112 MCG/DL (ref 37–145)
IRON SATN MFR SERPL: 43 % (ref 14–48)
LYMPHOCYTES # BLD AUTO: 1.16 10*3/MM3 (ref 1–3.5)
LYMPHOCYTES NFR BLD AUTO: 14.8 % (ref 20–49)
MCH RBC QN AUTO: 30.3 PG (ref 27–33)
MCHC RBC AUTO-ENTMCNC: 33.1 G/DL (ref 32–35)
MCV RBC AUTO: 91.5 FL (ref 83–97)
MONOCYTES # BLD AUTO: 0.99 10*3/MM3 (ref 0.25–0.8)
MONOCYTES NFR BLD AUTO: 12.7 % (ref 4–12)
NEUTROPHILS # BLD AUTO: 5.47 10*3/MM3 (ref 1.5–7)
NEUTROPHILS NFR BLD AUTO: 69.9 % (ref 39–75)
NRBC BLD MANUAL-RTO: 0 /100 WBC (ref 0–0)
PLATELET # BLD AUTO: 227 10*3/MM3 (ref 150–375)
PMV BLD AUTO: 10.1 FL (ref 8.9–12.1)
RBC # BLD AUTO: 4.36 10*6/MM3 (ref 3.9–5)
TIBC SERPL-MCNC: 263 MCG/DL (ref 249–505)
TRANSFERRIN SERPL-MCNC: 188 MG/DL (ref 200–360)
WBC NRBC COR # BLD: 7.82 10*3/MM3 (ref 4–10)

## 2017-11-15 PROCEDURE — 83540 ASSAY OF IRON: CPT | Performed by: INTERNAL MEDICINE

## 2017-11-15 PROCEDURE — 85025 COMPLETE CBC W/AUTO DIFF WBC: CPT | Performed by: INTERNAL MEDICINE

## 2017-11-15 PROCEDURE — 82728 ASSAY OF FERRITIN: CPT | Performed by: INTERNAL MEDICINE

## 2017-11-15 PROCEDURE — 36415 COLL VENOUS BLD VENIPUNCTURE: CPT | Performed by: INTERNAL MEDICINE

## 2017-11-15 PROCEDURE — 84466 ASSAY OF TRANSFERRIN: CPT | Performed by: INTERNAL MEDICINE

## 2017-12-20 LAB
EXTERNAL INR: 1.1
EXTERNAL PROTHROMBIN TIME (PT): 11.1 SECONDS
EXTERNAL RESULTING LABORATORY: NORMAL

## 2018-02-07 ENCOUNTER — OFFICE VISIT (OUTPATIENT)
Dept: ONCOLOGY | Facility: CLINIC | Age: 83
End: 2018-02-07

## 2018-02-07 ENCOUNTER — LAB (OUTPATIENT)
Dept: LAB | Facility: HOSPITAL | Age: 83
End: 2018-02-07

## 2018-02-07 VITALS
RESPIRATION RATE: 14 BRPM | OXYGEN SATURATION: 97 % | DIASTOLIC BLOOD PRESSURE: 70 MMHG | HEART RATE: 70 BPM | SYSTOLIC BLOOD PRESSURE: 110 MMHG | BODY MASS INDEX: 26.75 KG/M2 | TEMPERATURE: 98 F | WEIGHT: 151 LBS | HEIGHT: 63 IN

## 2018-02-07 DIAGNOSIS — K90.9 MALABSORPTION OF IRON: ICD-10-CM

## 2018-02-07 DIAGNOSIS — K92.0 HEMATEMESIS WITHOUT NAUSEA: ICD-10-CM

## 2018-02-07 DIAGNOSIS — D50.9 IRON DEFICIENCY ANEMIA, UNSPECIFIED IRON DEFICIENCY ANEMIA TYPE: Primary | ICD-10-CM

## 2018-02-07 DIAGNOSIS — D47.2 MONOCLONAL GAMMOPATHY: ICD-10-CM

## 2018-02-07 DIAGNOSIS — R13.14 PHARYNGOESOPHAGEAL DYSPHAGIA: ICD-10-CM

## 2018-02-07 DIAGNOSIS — D50.9 IRON DEFICIENCY ANEMIA, UNSPECIFIED IRON DEFICIENCY ANEMIA TYPE: ICD-10-CM

## 2018-02-07 LAB
BASOPHILS # BLD AUTO: 0.04 10*3/MM3 (ref 0–0.1)
BASOPHILS NFR BLD AUTO: 0.8 % (ref 0–1.1)
DEPRECATED RDW RBC AUTO: 41.7 FL (ref 37–49)
EOSINOPHIL # BLD AUTO: 0.17 10*3/MM3 (ref 0–0.36)
EOSINOPHIL NFR BLD AUTO: 3.5 % (ref 1–5)
ERYTHROCYTE [DISTWIDTH] IN BLOOD BY AUTOMATED COUNT: 12.2 % (ref 11.7–14.5)
FERRITIN SERPL-MCNC: 75.6 NG/ML
HCT VFR BLD AUTO: 41.9 % (ref 34–45)
HGB BLD-MCNC: 13.7 G/DL (ref 11.5–14.9)
IMM GRANULOCYTES # BLD: 0.03 10*3/MM3 (ref 0–0.03)
IMM GRANULOCYTES NFR BLD: 0.6 % (ref 0–0.5)
IRON 24H UR-MRATE: 85 MCG/DL (ref 37–145)
IRON SATN MFR SERPL: 24 % (ref 14–48)
LYMPHOCYTES # BLD AUTO: 0.66 10*3/MM3 (ref 1–3.5)
LYMPHOCYTES NFR BLD AUTO: 13.4 % (ref 20–49)
MCH RBC QN AUTO: 30.2 PG (ref 27–33)
MCHC RBC AUTO-ENTMCNC: 32.7 G/DL (ref 32–35)
MCV RBC AUTO: 92.3 FL (ref 83–97)
MONOCYTES # BLD AUTO: 0.49 10*3/MM3 (ref 0.25–0.8)
MONOCYTES NFR BLD AUTO: 10 % (ref 4–12)
NEUTROPHILS # BLD AUTO: 3.53 10*3/MM3 (ref 1.5–7)
NEUTROPHILS NFR BLD AUTO: 71.7 % (ref 39–75)
NRBC BLD MANUAL-RTO: 0 /100 WBC (ref 0–0)
PLATELET # BLD AUTO: 196 10*3/MM3 (ref 150–375)
PMV BLD AUTO: 10.5 FL (ref 8.9–12.1)
RBC # BLD AUTO: 4.54 10*6/MM3 (ref 3.9–5)
TIBC SERPL-MCNC: 347 MCG/DL (ref 249–505)
TRANSFERRIN SERPL-MCNC: 248 MG/DL (ref 200–360)
WBC NRBC COR # BLD: 4.92 10*3/MM3 (ref 4–10)

## 2018-02-07 PROCEDURE — 83540 ASSAY OF IRON: CPT | Performed by: INTERNAL MEDICINE

## 2018-02-07 PROCEDURE — 99213 OFFICE O/P EST LOW 20 MIN: CPT | Performed by: INTERNAL MEDICINE

## 2018-02-07 PROCEDURE — 84466 ASSAY OF TRANSFERRIN: CPT | Performed by: INTERNAL MEDICINE

## 2018-02-07 PROCEDURE — 85025 COMPLETE CBC W/AUTO DIFF WBC: CPT | Performed by: INTERNAL MEDICINE

## 2018-02-07 PROCEDURE — 82728 ASSAY OF FERRITIN: CPT | Performed by: INTERNAL MEDICINE

## 2018-02-07 PROCEDURE — 36415 COLL VENOUS BLD VENIPUNCTURE: CPT | Performed by: INTERNAL MEDICINE

## 2018-02-07 RX ORDER — SUCRALFATE 1 G/1
1 TABLET ORAL 4 TIMES DAILY
COMMUNITY

## 2018-02-07 NOTE — PROGRESS NOTES
Subjective     CHIEF COMPLAINT:      1. Monoclonal gammopathy of undetermined significance.   2. Anemia.     HISTORY OF PRESENT ILLNESS:     Randi Mera is an 89 y.o. patient who returns today for follow-up.  She is accompanied by her daughter.  She reports episodes of dysphagia averaging about once a week.  She had 1 episode yesterday despite chewing her food well.  She felt that food stuck about an hour.  She then had an episode of vomiting and noticed blood in the emesis.  This is the first time that she notices blood.    Patient tolerated the Feraheme infusions.  She felt better with improvement in her energy.    Past Medical History:   Diagnosis Date   • Anemia    • Degenerative arthritis    • Fatigue    • GERD (gastroesophageal reflux disease)    • Hiatal hernia    • Hypertension    • Monoclonal gammopathy of undetermined significance    • Rheumatic fever     AS A CHILD   • Schatzki's ring    • Ulcer    • Zenker diverticulum        Past Surgical History:   Procedure Laterality Date   • CHOLECYSTECTOMY  2008   • COLONOSCOPY  approx 2012    normal per patient  El HAYES   • HYSTERECTOMY     • REPLACEMENT TOTAL KNEE Right 2000       Cancer-related family history includes Cancer in her brother and brother.    SCHEDULED MEDS:    Current Outpatient Prescriptions:   •  amLODIPine (NORVASC) 10 MG tablet, Take 10 mg by mouth daily., Disp: , Rfl:   •  aspirin 81 MG EC tablet, Take 81 mg by mouth daily., Disp: , Rfl:   •  escitalopram (LEXAPRO) 10 MG tablet, Take 10 mg by mouth daily., Disp: , Rfl:   •  methylcellulose, Laxative, (CITRUCEL) 500 MG tablet tablet, Take 2 tablets by mouth 2 (Two) Times a Day With Meals., Disp: 120 tablet, Rfl: 3  •  Multiple Vitamins-Minerals (CENTRUM SILVER PO), Take  by mouth Daily., Disp: , Rfl:   •  Multiple Vitamins-Minerals (OCUVITE PO), Take  by mouth Daily., Disp: , Rfl:   •  pantoprazole (PROTONIX) 40 MG EC tablet, TAKE ONE TABLET BY MOUTH EVERY DAY, Disp: , Rfl: 5  •   "sucralfate (CARAFATE) 1 g tablet, Take 1 g by mouth 4 (Four) Times a Day. Pt mixes with water and takes 4 times daily, Disp: , Rfl:   •  SYMBICORT 160-4.5 MCG/ACT inhaler, TWO PUFFS EVERY TWELVE HOURS RINSE MOUTH AFTER EACH USE., Disp: , Rfl: 11  •  VESICARE 5 MG tablet, TAKE ONE TABLET BY MOUTH DAILY, Disp: , Rfl: 0    REVIEW OF SYSTEMS:  GENERAL:  No weight change.  Improvement in the Fatigue.   SKIN:  No skin rashes or lesions.  HEME/LYMPH: Anemia has improved. No Easy bruisability.   RESPIRATORY:  No Shortness of breath.    CVS:  No Chest pain.    GI:  See HPI.      Objective   VITAL SIGNS:  Vitals:    02/07/18 1155   BP: 110/70   Pulse: 70   Resp: 14   Temp: 98 °F (36.7 °C)   TempSrc: Oral   SpO2: 97%   Weight: 68.5 kg (151 lb)   Height: 160.7 cm (63.25\")   PainSc: 0-No pain       Wt Readings from Last 3 Encounters:   02/07/18 68.5 kg (151 lb)   09/06/17 69.2 kg (152 lb 9.6 oz)   08/30/17 68.8 kg (151 lb 9.6 oz)       PHYSICAL EXAMINATION:  GENERAL:  The patient appears her stated age, not in acute distress.  SKIN:  No skin rashes or lesions.  No ecchymosis or petechiae.  EYES:  No jaundice.  NECK:  No masses.  No thyromegaly.  LYMPHATICS:  No cervical or supraclavicular lymphadenopathy.  CHEST:  Clear bilaterally.  No added sounds.  ABDOMEN: Soft and nontender.  No hepatosplenomegaly.  No masses.  EXTREMITIES:  No edema.  NEUROLOGICAL:   No focal deficits.  .  RESULT REVIEW:       Results from last 7 days  Lab Units 02/07/18  1135   WBC 10*3/mm3 4.92   NEUTROS ABS 10*3/mm3 3.53   HEMOGLOBIN g/dL 13.7   HEMATOCRIT % 41.9   PLATELETS 10*3/mm3 196       Lab Results   Component Value Date    FERRITIN 75.60 02/07/2018    FERRITIN 172.00 11/15/2017    FERRITIN 15.80 08/22/2017    IRON 85 02/07/2018    IRON 112 11/15/2017    IRON 45 08/22/2017    TIBC 347 02/07/2018    TIBC 263 11/15/2017    [facility] 412 08/22/2017        Assessment/Plan    1.  Iron deficiency anemia. She did not tolerate oral iron.  She developed upset " stomach.  She did not absorbing the iron and multivitamin.  Therefore, we gave her Feraheme ×2 doses in January 2017 and in August of September 2017.  Hemoglobin has improved.  Iron stores are adequate.      2.  Dysphagia.  She reports an episode of hematemesis.  Dysphagia appears to be worsening.  Therefore, we'll refer her back to Dr Perez to see if she would need EGD and dilatation.     3.  Monoclonal gammopathy of undetermined significance.  M protein was 0.7 on 8/22/17.  We will repeat the paraprotein studies today.      PLAN:    1.  Refer back to Dr. Perez.    2.  Repeat CBC and iron studies in 4 months with RN review.    3.  Return for follow-up in 8 months with CBC and iron studies.          Nadir Ceja MD  02/07/18

## 2018-02-08 LAB
ALBUMIN SERPL-MCNC: 3.8 G/DL (ref 2.9–4.4)
ALBUMIN/GLOB SERPL: 1.4 {RATIO} (ref 0.7–1.7)
ALPHA1 GLOB FLD ELPH-MCNC: 0.2 G/DL (ref 0–0.4)
ALPHA2 GLOB SERPL ELPH-MCNC: 0.7 G/DL (ref 0.4–1)
B-GLOBULIN SERPL ELPH-MCNC: 0.9 G/DL (ref 0.7–1.3)
GAMMA GLOB SERPL ELPH-MCNC: 1.1 G/DL (ref 0.4–1.8)
GLOBULIN SER CALC-MCNC: 2.9 G/DL (ref 2.2–3.9)
IGA SERPL-MCNC: 156 MG/DL (ref 64–422)
IGG SERPL-MCNC: 1047 MG/DL (ref 700–1600)
IGM SERPL-MCNC: 57 MG/DL (ref 26–217)
INTERPRETATION SERPL IEP-IMP: ABNORMAL
KAPPA LC SERPL-MCNC: 25.2 MG/L (ref 3.3–19.4)
KAPPA LC/LAMBDA SER: 0.84 {RATIO} (ref 0.26–1.65)
LAMBDA LC FREE SERPL-MCNC: 30 MG/L (ref 5.7–26.3)
Lab: ABNORMAL
M-SPIKE: 0.5 G/DL
PROT SERPL-MCNC: 6.7 G/DL (ref 6–8.5)

## 2018-02-27 ENCOUNTER — OFFICE VISIT (OUTPATIENT)
Dept: GASTROENTEROLOGY | Facility: CLINIC | Age: 83
End: 2018-02-27

## 2018-02-27 VITALS
BODY MASS INDEX: 26.72 KG/M2 | HEIGHT: 63 IN | DIASTOLIC BLOOD PRESSURE: 76 MMHG | SYSTOLIC BLOOD PRESSURE: 124 MMHG | WEIGHT: 150.8 LBS

## 2018-02-27 DIAGNOSIS — R13.14 PHARYNGOESOPHAGEAL DYSPHAGIA: Primary | ICD-10-CM

## 2018-02-27 DIAGNOSIS — D50.9 IRON DEFICIENCY ANEMIA, UNSPECIFIED IRON DEFICIENCY ANEMIA TYPE: ICD-10-CM

## 2018-02-27 PROCEDURE — 99214 OFFICE O/P EST MOD 30 MIN: CPT | Performed by: INTERNAL MEDICINE

## 2018-02-27 NOTE — PROGRESS NOTES
Chief Complaint   Patient presents with   • Follow-up   • Hiatal Hernia     Subjective     HPI  Randi Mera is a 89 y.o. female who presents dysphagia.  As part of the workup for her dysphagia, I obtained a esophagram in July.  She did have a prominent cricopharyngeus, Zenkers diverticulum, hiatal hernia and schatzki's ring but all diameters were noted to be greater than 1.6 cm.  She has not had any weight loss.  She reported improvement following the esophagram in August and did not want any further intervention.  There was some question of laryngeal aspiration.  She subsequently had a VFSS which was normal and she was cleared for all food consistencies and to avoid distractions.  She presents now for follow up of further dysphagia.      Per her reports, she was getting food stuck every other day approximately a month ago.  She was still taking pantoprazole at that time.  This was mostly with dry breads but sometimes meat as well.  She resumed her Carafate and found that she did better for an entire month, however yesterday she says that she ate a great that became lodged.  She felt that it got stuck in her midesophagus.  She says that it lasted about 2 hours.  She was unable to swallow her secretions nor to drink liquids.  She was very uncomfortable but it did eventually pass.    He is not lost any weight.  She reports that she snacks most of the day.  Sometimes she has a poor appetite but she will drink a boost or Ensure during these times.  She has no nausea, vomiting, abdominal pain.  She reports that her bowel movements are stable.        Past Medical History:   Diagnosis Date   • Anemia    • Degenerative arthritis    • Fatigue    • GERD (gastroesophageal reflux disease)    • Hiatal hernia    • Hypertension    • Monoclonal gammopathy of undetermined significance    • Rheumatic fever     AS A CHILD   • Schatzki's ring    • Ulcer    • Zenker diverticulum        Social History     Social History   • Marital  status:      Occupational History   • Pharmacist Retired     Social History Main Topics   • Smoking status: Current Every Day Smoker     Packs/day: 0.25     Types: Cigarettes     Start date: 1940   • Smokeless tobacco: Former User      Comment: off and on thru out life   • Alcohol use Yes      Comment: seldom   • Drug use: No         Current Outpatient Prescriptions:   •  amLODIPine (NORVASC) 10 MG tablet, Take 10 mg by mouth daily., Disp: , Rfl:   •  aspirin 81 MG EC tablet, Take 81 mg by mouth daily., Disp: , Rfl:   •  escitalopram (LEXAPRO) 10 MG tablet, Take 10 mg by mouth daily., Disp: , Rfl:   •  Multiple Vitamins-Minerals (CENTRUM SILVER PO), Take  by mouth Daily., Disp: , Rfl:   •  Multiple Vitamins-Minerals (OCUVITE PO), Take  by mouth Daily., Disp: , Rfl:   •  sucralfate (CARAFATE) 1 g tablet, Take 1 g by mouth 4 (Four) Times a Day. Pt mixes with water and takes 4 times daily, Disp: , Rfl:   •  SYMBICORT 160-4.5 MCG/ACT inhaler, TWO PUFFS EVERY TWELVE HOURS RINSE MOUTH AFTER EACH USE., Disp: , Rfl: 11  •  methylcellulose, Laxative, (CITRUCEL) 500 MG tablet tablet, Take 2 tablets by mouth 2 (Two) Times a Day With Meals., Disp: 120 tablet, Rfl: 3  •  pantoprazole (PROTONIX) 40 MG EC tablet, TAKE ONE TABLET BY MOUTH EVERY DAY, Disp: , Rfl: 5  •  VESICARE 5 MG tablet, TAKE ONE TABLET BY MOUTH DAILY, Disp: , Rfl: 0    Review of Systems   Constitutional: Negative for activity change, appetite change and fatigue.   HENT: Positive for trouble swallowing. Negative for sore throat.    Respiratory: Negative.    Cardiovascular: Negative.    Gastrointestinal: Negative for abdominal distention, abdominal pain and blood in stool.   Endocrine: Negative for cold intolerance and heat intolerance.   Genitourinary: Negative for difficulty urinating, dysuria and frequency.   Musculoskeletal: Negative for arthralgias, back pain and myalgias.   Skin: Negative.    Hematological: Negative for adenopathy. Does not  bruise/bleed easily.   All other systems reviewed and are negative.      Objective   Vitals:    02/27/18 1345   BP: 124/76     Last Weight    02/27/18  1345   Weight: 68.4 kg (150 lb 12.8 oz)     Body mass index is 26.71 kg/(m^2).      Physical Exam   Constitutional: She is oriented to person, place, and time. She appears well-developed and well-nourished. No distress.   HENT:   Head: Normocephalic and atraumatic.   Right Ear: External ear normal.   Left Ear: External ear normal.   Nose: Nose normal.   Mouth/Throat: Oropharynx is clear and moist.   Eyes: Conjunctivae and EOM are normal. Right eye exhibits no discharge. Left eye exhibits no discharge. No scleral icterus.   Neck: Normal range of motion. Neck supple. No thyromegaly present.   No supraclavicular adenopathy   Cardiovascular: Normal rate, regular rhythm, normal heart sounds and intact distal pulses.  Exam reveals no gallop.    No murmur heard.  No lower extremity edema   Pulmonary/Chest: Effort normal and breath sounds normal. No respiratory distress. She has no wheezes.   Abdominal: Soft. Normal appearance and bowel sounds are normal. She exhibits no distension and no mass. There is no hepatosplenomegaly. There is no tenderness. There is no rigidity, no rebound and no guarding. No hernia.   Genitourinary:   Genitourinary Comments: Rectal exam deferred   Musculoskeletal: Normal range of motion. She exhibits no edema or tenderness.   No atrophy of upper or lower extremities.  Normal digits and nails of both hands.   Lymphadenopathy:     She has no cervical adenopathy.   Neurological: She is alert and oriented to person, place, and time. She displays no atrophy. Coordination normal.   Skin: Skin is warm and dry. No rash noted. She is not diaphoretic. No erythema.   Psychiatric: She has a normal mood and affect. Her behavior is normal. Judgment and thought content normal.   Vitals reviewed.      WBC   Date Value Ref Range Status   02/07/2018 4.92 4.00 -  10.00 10*3/mm3 Final     RBC   Date Value Ref Range Status   02/07/2018 4.54 3.90 - 5.00 10*6/mm3 Final     Hemoglobin   Date Value Ref Range Status   02/07/2018 13.7 11.5 - 14.9 g/dL Final     Hematocrit   Date Value Ref Range Status   02/07/2018 41.9 34.0 - 45.0 % Final     MCV   Date Value Ref Range Status   02/07/2018 92.3 83.0 - 97.0 fL Final     MCH   Date Value Ref Range Status   02/07/2018 30.2 27.0 - 33.0 pg Final     MCHC   Date Value Ref Range Status   02/07/2018 32.7 32.0 - 35.0 g/dL Final     RDW   Date Value Ref Range Status   02/07/2018 12.2 11.7 - 14.5 % Final     RDW-SD   Date Value Ref Range Status   02/07/2018 41.7 37.0 - 49.0 fl Final     MPV   Date Value Ref Range Status   02/07/2018 10.5 8.9 - 12.1 fL Final     Platelets   Date Value Ref Range Status   02/07/2018 196 150 - 375 10*3/mm3 Final     Neutrophil %   Date Value Ref Range Status   02/07/2018 71.7 39.0 - 75.0 % Final     Lymphocyte %   Date Value Ref Range Status   02/07/2018 13.4 (L) 20.0 - 49.0 % Final     Monocyte %   Date Value Ref Range Status   02/07/2018 10.0 4.0 - 12.0 % Final     Eosinophil %   Date Value Ref Range Status   02/07/2018 3.5 1.0 - 5.0 % Final     Basophil %   Date Value Ref Range Status   02/07/2018 0.8 0.0 - 1.1 % Final     Immature Grans %   Date Value Ref Range Status   02/07/2018 0.6 (H) 0.0 - 0.5 % Final     Neutrophils, Absolute   Date Value Ref Range Status   02/07/2018 3.53 1.50 - 7.00 10*3/mm3 Final     Lymphocytes, Absolute   Date Value Ref Range Status   02/07/2018 0.66 (L) 1.00 - 3.50 10*3/mm3 Final     Monocytes, Absolute   Date Value Ref Range Status   02/07/2018 0.49 0.25 - 0.80 10*3/mm3 Final     Eosinophils, Absolute   Date Value Ref Range Status   02/07/2018 0.17 0.00 - 0.36 10*3/mm3 Final     Basophils, Absolute   Date Value Ref Range Status   02/07/2018 0.04 0.00 - 0.10 10*3/mm3 Final     Immature Grans, Absolute   Date Value Ref Range Status   02/07/2018 0.03 0.00 - 0.03 10*3/mm3 Final      [facility]   Date Value Ref Range Status   02/07/2018 0.0 0.0 - 0.0 /100 WBC Final       Lab Results   Component Value Date    GLUCOSE 93 08/22/2017    BUN 24 (H) 08/22/2017    CREATININE 1.06 08/22/2017    EGFRIFNONA 49 (L) 08/22/2017    BCR 22.6 08/22/2017    CO2 22.6 08/22/2017    CALCIUM 8.8 08/22/2017    PROTENTOTREF 6.7 02/07/2018    ALBUMIN 3.8 02/07/2018    LABIL2 1.4 02/07/2018    AST 17 08/22/2017    ALT 11 08/22/2017         Esophagram 7/2017  CONCLUSION: Dysphagia with deep laryngeal penetration and trace  aspiration to the mainstem bronchi. This was predominantly silent. Mild  upper esophageal ring at the level of cricopharyngeus with associated  mild prominence of cricopharyngeus and with demonstration of a small  Zenker diverticulum. Slightly irregular and slightly thick Schatzki ring  narrowing of the esophagogastric junction above a moderately large and  predominantly sliding type hiatal herniation of the upper stomach with  gastroesophageal reflux, equivocal esophageal mucosal fold thickness  regarding presence or absence of esophagitis or other borderline  thickening of esophageal mucosal folds, and with some superimposed  spastic dysmotility of the distal thoracic esophagus.      This report was finalized on 7/17/2017 2:48 PM by Dr. Cem Mcbride MD.      Assessment/Plan    1. Dysphagia: multifactorial issue.  Her esophagram shows numerous areas that can cause issues including a Zenker's diverticulum, Schatzki's ring and hiatal hernia along with some spasticity of the distal esophagus.  Her weight is stable.  She seems to be doing a bit better with Carafate    2. SHAY: due to MGUS, followed by CBC group and stable, gets IV iron prn    Plan  -Continue Carafate  -Resume pantoprazole  -2 alkaloids peppermints before each meal to see if this helps with spasticity  -To the ER for any food sticking for more than an hour  -We will consider EGD if the above measures don't help; I did discuss that I am not  certain EGD could do much especially if the biggest part of her issues are related to the Zenker's diverticulum or the spasticity of the esophagus.  While ultimately I'm not excited about the idea of performing an EGD with possible dilation in a 89-year-old, she is otherwise healthy and highly functioning.    Greater than 15 minutes was spent face-to-face discussing the varied findings of her abnormal esophagram and how each could be impacting her issues with swallowing.  Additionally we discussed the possible treatment options including surgery for the Zenker's diverticulum, EGD with possible dilation, and medical management.    Randi was seen today for follow-up and hiatal hernia.    Diagnoses and all orders for this visit:    Pharyngoesophageal dysphagia    Iron deficiency anemia, unspecified iron deficiency anemia type        Dictated utilizing Dragon dictation

## 2018-02-27 NOTE — PATIENT INSTRUCTIONS
Chew 2 peppermint altoids before every meal    Continue the carafate    Start back on the pantoprazole    To the ER for any food sticking more than hour    For any additional questions, concerns or changes to your condition after today's office visit please contact the office at [phone].

## 2018-06-06 ENCOUNTER — LAB (OUTPATIENT)
Dept: LAB | Facility: HOSPITAL | Age: 83
End: 2018-06-06

## 2018-06-06 ENCOUNTER — CLINICAL SUPPORT (OUTPATIENT)
Dept: ONCOLOGY | Facility: HOSPITAL | Age: 83
End: 2018-06-06

## 2018-06-06 DIAGNOSIS — D50.9 IRON DEFICIENCY ANEMIA, UNSPECIFIED IRON DEFICIENCY ANEMIA TYPE: ICD-10-CM

## 2018-06-06 LAB
BASOPHILS # BLD AUTO: 0.03 10*3/MM3 (ref 0–0.1)
BASOPHILS NFR BLD AUTO: 0.6 % (ref 0–1.1)
DEPRECATED RDW RBC AUTO: 41.6 FL (ref 37–49)
EOSINOPHIL # BLD AUTO: 0.21 10*3/MM3 (ref 0–0.36)
EOSINOPHIL NFR BLD AUTO: 4.1 % (ref 1–5)
ERYTHROCYTE [DISTWIDTH] IN BLOOD BY AUTOMATED COUNT: 12.7 % (ref 11.7–14.5)
FERRITIN SERPL-MCNC: 54.3 NG/ML
HCT VFR BLD AUTO: 41.8 % (ref 34–45)
HGB BLD-MCNC: 13.3 G/DL (ref 11.5–14.9)
IMM GRANULOCYTES # BLD: 0.06 10*3/MM3 (ref 0–0.03)
IMM GRANULOCYTES NFR BLD: 1.2 % (ref 0–0.5)
IRON 24H UR-MRATE: 99 MCG/DL (ref 37–145)
IRON SATN MFR SERPL: 31 % (ref 14–48)
LYMPHOCYTES # BLD AUTO: 0.71 10*3/MM3 (ref 1–3.5)
LYMPHOCYTES NFR BLD AUTO: 13.9 % (ref 20–49)
MCH RBC QN AUTO: 28.5 PG (ref 27–33)
MCHC RBC AUTO-ENTMCNC: 31.8 G/DL (ref 32–35)
MCV RBC AUTO: 89.7 FL (ref 83–97)
MONOCYTES # BLD AUTO: 0.55 10*3/MM3 (ref 0.25–0.8)
MONOCYTES NFR BLD AUTO: 10.7 % (ref 4–12)
NEUTROPHILS # BLD AUTO: 3.56 10*3/MM3 (ref 1.5–7)
NEUTROPHILS NFR BLD AUTO: 69.5 % (ref 39–75)
NRBC BLD MANUAL-RTO: 0 /100 WBC (ref 0–0)
PLATELET # BLD AUTO: 192 10*3/MM3 (ref 150–375)
PMV BLD AUTO: 10.4 FL (ref 8.9–12.1)
RBC # BLD AUTO: 4.66 10*6/MM3 (ref 3.9–5)
TIBC SERPL-MCNC: 323 MCG/DL (ref 249–505)
TRANSFERRIN SERPL-MCNC: 231 MG/DL (ref 200–360)
WBC NRBC COR # BLD: 5.12 10*3/MM3 (ref 4–10)

## 2018-06-06 PROCEDURE — 82728 ASSAY OF FERRITIN: CPT | Performed by: INTERNAL MEDICINE

## 2018-06-06 PROCEDURE — 36415 COLL VENOUS BLD VENIPUNCTURE: CPT | Performed by: INTERNAL MEDICINE

## 2018-06-06 PROCEDURE — 84466 ASSAY OF TRANSFERRIN: CPT | Performed by: INTERNAL MEDICINE

## 2018-06-06 PROCEDURE — 83540 ASSAY OF IRON: CPT | Performed by: INTERNAL MEDICINE

## 2018-06-06 PROCEDURE — 85025 COMPLETE CBC W/AUTO DIFF WBC: CPT | Performed by: INTERNAL MEDICINE

## 2018-06-06 NOTE — PROGRESS NOTES
Labs satisfactory.  Pt left the office without having CBC lab review.  Ferritin and iron panel drawn on pt today.

## 2018-10-04 ENCOUNTER — LAB (OUTPATIENT)
Dept: LAB | Facility: HOSPITAL | Age: 83
End: 2018-10-04

## 2018-10-04 ENCOUNTER — OFFICE VISIT (OUTPATIENT)
Dept: ONCOLOGY | Facility: CLINIC | Age: 83
End: 2018-10-04

## 2018-10-04 VITALS
HEIGHT: 62 IN | OXYGEN SATURATION: 100 % | TEMPERATURE: 97.5 F | DIASTOLIC BLOOD PRESSURE: 68 MMHG | HEART RATE: 79 BPM | RESPIRATION RATE: 16 BRPM | SYSTOLIC BLOOD PRESSURE: 114 MMHG | BODY MASS INDEX: 25.8 KG/M2 | WEIGHT: 140.2 LBS

## 2018-10-04 DIAGNOSIS — D50.9 IRON DEFICIENCY ANEMIA, UNSPECIFIED IRON DEFICIENCY ANEMIA TYPE: ICD-10-CM

## 2018-10-04 DIAGNOSIS — K90.9 MALABSORPTION OF IRON: ICD-10-CM

## 2018-10-04 DIAGNOSIS — D47.2 MONOCLONAL GAMMOPATHY: ICD-10-CM

## 2018-10-04 DIAGNOSIS — D50.9 IRON DEFICIENCY ANEMIA, UNSPECIFIED IRON DEFICIENCY ANEMIA TYPE: Primary | ICD-10-CM

## 2018-10-04 DIAGNOSIS — E83.42 HYPOMAGNESEMIA: ICD-10-CM

## 2018-10-04 LAB
BASOPHILS # BLD AUTO: 0.05 10*3/MM3 (ref 0–0.1)
BASOPHILS NFR BLD AUTO: 0.7 % (ref 0–1.1)
DEPRECATED RDW RBC AUTO: 41.2 FL (ref 37–49)
EOSINOPHIL # BLD AUTO: 0.28 10*3/MM3 (ref 0–0.36)
EOSINOPHIL NFR BLD AUTO: 4.1 % (ref 1–5)
ERYTHROCYTE [DISTWIDTH] IN BLOOD BY AUTOMATED COUNT: 13 % (ref 11.7–14.5)
FERRITIN SERPL-MCNC: 21.3 NG/ML
HCT VFR BLD AUTO: 40.9 % (ref 34–45)
HGB BLD-MCNC: 13.4 G/DL (ref 11.5–14.9)
IMM GRANULOCYTES # BLD: 0.04 10*3/MM3 (ref 0–0.03)
IMM GRANULOCYTES NFR BLD: 0.6 % (ref 0–0.5)
IRON 24H UR-MRATE: 102 MCG/DL (ref 37–145)
IRON SATN MFR SERPL: 24 % (ref 14–48)
LYMPHOCYTES # BLD AUTO: 1.18 10*3/MM3 (ref 1–3.5)
LYMPHOCYTES NFR BLD AUTO: 17.3 % (ref 20–49)
MAGNESIUM SERPL-MCNC: 1.9 MG/DL (ref 1.8–2.5)
MCH RBC QN AUTO: 28.8 PG (ref 27–33)
MCHC RBC AUTO-ENTMCNC: 32.8 G/DL (ref 32–35)
MCV RBC AUTO: 87.8 FL (ref 83–97)
MONOCYTES # BLD AUTO: 0.78 10*3/MM3 (ref 0.25–0.8)
MONOCYTES NFR BLD AUTO: 11.5 % (ref 4–12)
NEUTROPHILS # BLD AUTO: 4.48 10*3/MM3 (ref 1.5–7)
NEUTROPHILS NFR BLD AUTO: 65.8 % (ref 39–75)
NRBC BLD MANUAL-RTO: 0 /100 WBC (ref 0–0)
PLATELET # BLD AUTO: 211 10*3/MM3 (ref 150–375)
PMV BLD AUTO: 10.6 FL (ref 8.9–12.1)
RBC # BLD AUTO: 4.66 10*6/MM3 (ref 3.9–5)
TIBC SERPL-MCNC: 417 MCG/DL (ref 249–505)
TRANSFERRIN SERPL-MCNC: 298 MG/DL (ref 200–360)
WBC NRBC COR # BLD: 6.81 10*3/MM3 (ref 4–10)

## 2018-10-04 PROCEDURE — 83735 ASSAY OF MAGNESIUM: CPT | Performed by: INTERNAL MEDICINE

## 2018-10-04 PROCEDURE — 99213 OFFICE O/P EST LOW 20 MIN: CPT | Performed by: INTERNAL MEDICINE

## 2018-10-04 PROCEDURE — 84466 ASSAY OF TRANSFERRIN: CPT

## 2018-10-04 PROCEDURE — 36415 COLL VENOUS BLD VENIPUNCTURE: CPT | Performed by: INTERNAL MEDICINE

## 2018-10-04 PROCEDURE — 85025 COMPLETE CBC W/AUTO DIFF WBC: CPT

## 2018-10-04 PROCEDURE — 82728 ASSAY OF FERRITIN: CPT

## 2018-10-04 PROCEDURE — 83540 ASSAY OF IRON: CPT

## 2018-10-04 NOTE — PROGRESS NOTES
Subjective     CHIEF COMPLAINT:      Chief Complaint   Patient presents with   • Follow-up     discuss hgb levels         HISTORY OF PRESENT ILLNESS:     Randi Mera is a 89 y.o. female patient who returns today for follow up on iron deficiency anemia.  She returns today for follow up accompenied by her son. She is not having problem with fatigue. She is not noticing blood in the stool or urine. She recently had a nose bleed and had to go to the ER and the bleeding site was cauterized.  Bleeding did not recur afterwards but she developed an upper respiratory infection and she had to be treated with a course of Amoxicillin.        Past Medical History:   Diagnosis Date   • Anemia    • Degenerative arthritis    • Fatigue    • GERD (gastroesophageal reflux disease)    • Hiatal hernia    • Hypertension    • Monoclonal gammopathy of undetermined significance    • Rheumatic fever     AS A CHILD   • Schatzki's ring    • Ulcer    • Zenker diverticulum        Past Surgical History:   Procedure Laterality Date   • CHOLECYSTECTOMY  2008   • COLONOSCOPY  approx 2012    normal per patient  [facility]   • HYSTERECTOMY     • REPLACEMENT TOTAL KNEE Right 2000       Cancer-related family history includes Cancer in her brother and brother.  Social History   Substance Use Topics   • Smoking status: Current Every Day Smoker     Packs/day: 0.25     Types: Cigarettes     Start date: 1940   • Smokeless tobacco: Former User      Comment: off and on thru out life   • Alcohol use Yes      Comment: seldom       MEDICATIONS:    Current Outpatient Prescriptions:   •  amLODIPine (NORVASC) 10 MG tablet, Take 10 mg by mouth daily., Disp: , Rfl:   •  aspirin 81 MG EC tablet, Take 81 mg by mouth daily., Disp: , Rfl:   •  escitalopram (LEXAPRO) 10 MG tablet, Take 10 mg by mouth daily., Disp: , Rfl:   •  methylcellulose, Laxative, (CITRUCEL) 500 MG tablet tablet, Take 2 tablets by mouth 2 (Two) Times a Day With Meals., Disp: 120 tablet, Rfl: 3  •   "Multiple Vitamins-Minerals (CENTRUM SILVER PO), Take  by mouth Daily., Disp: , Rfl:   •  Multiple Vitamins-Minerals (OCUVITE PO), Take  by mouth Daily., Disp: , Rfl:   •  pantoprazole (PROTONIX) 40 MG EC tablet, TAKE ONE TABLET BY MOUTH EVERY DAY, Disp: , Rfl: 5  •  sucralfate (CARAFATE) 1 g tablet, Take 1 g by mouth 4 (Four) Times a Day. Pt mixes with water and takes 4 times daily, Disp: , Rfl:   •  SYMBICORT 160-4.5 MCG/ACT inhaler, TWO PUFFS EVERY TWELVE HOURS RINSE MOUTH AFTER EACH USE., Disp: , Rfl: 11  •  VESICARE 5 MG tablet, TAKE ONE TABLET BY MOUTH DAILY, Disp: , Rfl: 0    ALLERGIES:  Allergies   Allergen Reactions   • Actonel [Risedronate Sodium]    • Furacin [Nitrofurazone]    • Lambs Quarters    • Latex    • Prozac [Fluoxetine Hcl]    • Shellfish-Derived Products        REVIEW OF SYSTEMS:  Review of Systems   Constitutional: Negative for chills, fever and unexpected weight change.   HENT: Negative for mouth sores, nosebleeds, sore throat and voice change.    Eyes: Negative for visual disturbance.   Respiratory: Negative for cough and shortness of breath.    Cardiovascular: Negative for chest pain and leg swelling.   Gastrointestinal: Negative for abdominal pain, blood in stool, constipation, diarrhea, nausea and vomiting.   Genitourinary: Negative for dysuria, frequency and hematuria.   Musculoskeletal: Negative for arthralgias, back pain and joint swelling.   Skin: Negative for rash.   Neurological: Negative for dizziness, numbness and headaches.   Hematological: Negative for adenopathy. Does not bruise/bleed easily.   Psychiatric/Behavioral: Negative for dysphoric mood. The patient is not nervous/anxious.          Objective   VITAL SIGNS:     Vitals:    10/04/18 1144   BP: 114/68   Pulse: 79   Resp: 16   Temp: 97.5 °F (36.4 °C)   TempSrc: Oral   SpO2: 100%   Weight: 63.6 kg (140 lb 3.2 oz)   Height: 158 cm (62.21\")  Comment: new ht   PainSc: 0-No pain     Body mass index is 25.47 kg/m².     Wt Readings " from Last 3 Encounters:   10/04/18 63.6 kg (140 lb 3.2 oz)   02/27/18 68.4 kg (150 lb 12.8 oz)   02/07/18 68.5 kg (151 lb)       PHYSICAL EXAMINATION:  GENERAL:  The patient appears in good general condition, not in acute distress.  SKIN: Warm and dry. No skin rashes, ecchymosis or petechiae.  HEAD:  Normocephalic.  EYES:  No Jaundice. No Pallor. Pupils equal. EOMI.  ABDOMEN:  Soft. No tenderness. No Hepatomegaly. No Splenomegaly. No masses.  EXTREMITIES:  No arthritic changes. No Edema. No Calf tenderness.  NEUROLOGICAL:  No Focal neurological deficits.      DIAGNOSTIC DATA:       Results from last 7 days  Lab Units 10/04/18  1122   WBC 10*3/mm3 6.81   NEUTROS ABS 10*3/mm3 4.48   HEMOGLOBIN g/dL 13.4   HEMATOCRIT % 40.9   PLATELETS 10*3/mm3 211       Results from last 7 days  Lab Units 10/04/18  1122   FERRITIN ng/mL 21.30   IRON mcg/dL 102   TIBC mcg/dL 417         Assessment/Plan   1.  Iron deficiency anemia. She did not tolerate oral iron.  She developed upset stomach.  She did not absorbing the iron and multivitamin.  Therefore, we gave her Feraheme ×2 doses in January 2017 and in August of September 2017.  Her iron stores have decreased but hemoglobin remains normal.  She is asymptomatic.    2.  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  M protein was 0.7 on 8/22/17 but decreased to 0.5 on 2/7/18.  Based on the low level and the patient being 89 years old, I would not recommend additional monitoring of this.    3.  History of hypomagnesemia.  She had a low magnesium level in the past. We will repeat a magnesium level today and contact her if she is deficient.      PLAN:    The patient indicated that she has difficulty going to different physician  appointments.  She asked if her PCP can follow her anemia.  I gave her the okay and recommended having the CBC ferritin and iron panel monitored every 6 months.  If she becomes anemic, we will be happy to see her again at that point and  arrange for IV iron infusions.        Nadir Ceja MD  10/04/18

## 2019-01-16 ENCOUNTER — OFFICE VISIT (OUTPATIENT)
Dept: GASTROENTEROLOGY | Facility: CLINIC | Age: 84
End: 2019-01-16

## 2019-01-16 VITALS
BODY MASS INDEX: 26.02 KG/M2 | TEMPERATURE: 97.8 F | DIASTOLIC BLOOD PRESSURE: 72 MMHG | HEIGHT: 62 IN | WEIGHT: 141.4 LBS | SYSTOLIC BLOOD PRESSURE: 122 MMHG

## 2019-01-16 DIAGNOSIS — R11.10 REGURGITATION OF FOOD: ICD-10-CM

## 2019-01-16 DIAGNOSIS — R13.14 PHARYNGOESOPHAGEAL DYSPHAGIA: Primary | ICD-10-CM

## 2019-01-16 PROCEDURE — 99214 OFFICE O/P EST MOD 30 MIN: CPT | Performed by: INTERNAL MEDICINE

## 2019-01-16 RX ORDER — HYDROCHLOROTHIAZIDE 12.5 MG/1
12.5 TABLET ORAL DAILY
Refills: 11 | COMMUNITY
Start: 2018-12-27

## 2019-01-16 RX ORDER — FESOTERODINE FUMARATE 4 MG/1
4 TABLET, FILM COATED, EXTENDED RELEASE ORAL DAILY
Refills: 11 | COMMUNITY
Start: 2019-01-14

## 2019-01-16 NOTE — PATIENT INSTRUCTIONS
Continue to eat soft foods    Caution with eating and talking    Please call the office if you would like to have the EGD test    For any additional questions, concerns or changes to your condition after today's office visit please contact the office at [phone].

## 2019-01-16 NOTE — PROGRESS NOTES
Chief Complaint   Patient presents with   • Pharyngoesophageal dysphagia     Subjective     HPI  Randi Mera is a 89 y.o. female who presents for follow up.  She saw me last in Feb 2018 for complaints of dysphagia.  Workup has included an esophagram showing a prominent cricopharyngeus, Zenkers diverticulum, hiatal hernia and schatzki's ring but all diameters were noted to be greater than 1.6 cm.  She had a  VFSS which was normal.      Last seen in February 2018, she was doing about the same but her weight was stable.  I offered the option of an EGD for worsening of symptoms but she did not follow up until now.    Persists with issues with swallowing.  Mostly with dry foods, like toast.  Worse when she is eating with other people, talking and not paying attention to eating and chewing.  3 episodes this week where food got stuck, then she coughs and ends up regurgitating her whole meal.  She has had about 10 pounds of weight loss since her last visit.  She does say that this is related to not eating as much.  She continues to eat meat and vegetables.  She has not pursued any soft food diet at this time.  She otherwise is feeling okay.  Has 10 children all of who provide care for her.    She also has SHAY that has been attributed to MGUS and followed by Dr Ceja.        Past Medical History:   Diagnosis Date   • Anemia    • Degenerative arthritis    • Fatigue    • GERD (gastroesophageal reflux disease)    • Hiatal hernia    • Hypertension    • Monoclonal gammopathy of undetermined significance    • Rheumatic fever     AS A CHILD   • Schatzki's ring    • Ulcer    • Zenker diverticulum        Social History     Socioeconomic History   • Marital status:      Spouse name: Not on file   • Number of children: Not on file   • Years of education: Not on file   • Highest education level: Not on file   Occupational History   • Occupation: Pharmacist     Employer: RETIRED   Tobacco Use   • Smoking status: Current Some  Day Smoker     Packs/day: 0.25     Types: Cigarettes     Start date: 1940   • Smokeless tobacco: Former User   • Tobacco comment: off and on thru out life   Substance and Sexual Activity   • Alcohol use: Yes     Comment: seldom   • Drug use: No   • Sexual activity: Defer         Current Outpatient Medications:   •  amLODIPine (NORVASC) 10 MG tablet, Take 10 mg by mouth daily., Disp: , Rfl:   •  aspirin 81 MG EC tablet, Take 81 mg by mouth daily., Disp: , Rfl:   •  DiphenhydrAMINE HCl (BENADRYL ALLERGY PO), Take  by mouth., Disp: , Rfl:   •  escitalopram (LEXAPRO) 10 MG tablet, Take 10 mg by mouth daily., Disp: , Rfl:   •  hydrochlorothiazide (HYDRODIURIL) 12.5 MG tablet, Take 12.5 mg by mouth Daily., Disp: , Rfl: 11  •  Magnesium Gluconate (MAGNESIUM 27 PO), Take  by mouth., Disp: , Rfl:   •  Multiple Vitamins-Minerals (CENTRUM SILVER PO), Take  by mouth Daily., Disp: , Rfl:   •  Multiple Vitamins-Minerals (OCUVITE PO), Take  by mouth Daily., Disp: , Rfl:   •  pantoprazole (PROTONIX) 40 MG EC tablet, TAKE ONE TABLET BY MOUTH EVERY DAY, Disp: , Rfl: 5  •  POTASSIUM CHLORIDE PO, Take  by mouth., Disp: , Rfl:   •  sucralfate (CARAFATE) 1 g tablet, Take 1 g by mouth 4 (Four) Times a Day. Pt mixes with water and takes 4 times daily, Disp: , Rfl:   •  SYMBICORT 160-4.5 MCG/ACT inhaler, TWO PUFFS EVERY TWELVE HOURS RINSE MOUTH AFTER EACH USE., Disp: , Rfl: 11  •  TOVIAZ 4 MG tablet sustained-release 24 hour tablet, Take 4 mg by mouth Daily., Disp: , Rfl: 11  •  methylcellulose, Laxative, (CITRUCEL) 500 MG tablet tablet, Take 2 tablets by mouth 2 (Two) Times a Day With Meals., Disp: 120 tablet, Rfl: 3    Review of Systems   Constitutional: Positive for unexpected weight change. Negative for activity change, appetite change and fatigue.   HENT: Positive for trouble swallowing. Negative for sore throat.    Respiratory: Negative.    Cardiovascular: Negative.    Gastrointestinal: Negative for abdominal distention, abdominal  pain and blood in stool.   Endocrine: Negative for cold intolerance and heat intolerance.   Genitourinary: Negative for difficulty urinating, dysuria and frequency.   Musculoskeletal: Negative for arthralgias, back pain and myalgias.   Skin: Negative.    Hematological: Negative for adenopathy. Does not bruise/bleed easily.   All other systems reviewed and are negative.      Objective   Vitals:    01/16/19 1518   BP: 122/72   Temp: 97.8 °F (36.6 °C)         01/16/19  1518   Weight: 64.1 kg (141 lb 6.4 oz)     Body mass index is 25.86 kg/m².      Physical Exam   Constitutional: She is oriented to person, place, and time. She appears well-developed and well-nourished. No distress.   HENT:   Head: Normocephalic and atraumatic.   Right Ear: External ear normal.   Left Ear: External ear normal.   Nose: Nose normal.   Mouth/Throat: Oropharynx is clear and moist.   Eyes: Conjunctivae and EOM are normal. Right eye exhibits no discharge. Left eye exhibits no discharge. No scleral icterus.   Neck: Normal range of motion. Neck supple. No thyromegaly present.   No supraclavicular adenopathy   Cardiovascular: Normal rate, regular rhythm, normal heart sounds and intact distal pulses. Exam reveals no gallop.   No murmur heard.  No lower extremity edema   Pulmonary/Chest: Effort normal and breath sounds normal. No respiratory distress. She has no wheezes.   Abdominal: Soft. Normal appearance and bowel sounds are normal. She exhibits no distension and no mass. There is no hepatosplenomegaly. There is no tenderness. There is no rigidity, no rebound and no guarding. No hernia.   Genitourinary:   Genitourinary Comments: Rectal exam deferred   Musculoskeletal: Normal range of motion. She exhibits no edema or tenderness.   No atrophy of upper or lower extremities.  Normal digits and nails of both hands.   Lymphadenopathy:     She has no cervical adenopathy.   Neurological: She is alert and oriented to person, place, and time. She displays  no atrophy. Coordination normal.   Skin: Skin is warm and dry. No rash noted. She is not diaphoretic. No erythema.   Psychiatric: She has a normal mood and affect. Her behavior is normal. Judgment and thought content normal.   Vitals reviewed.      WBC   Date Value Ref Range Status   10/04/2018 6.81 4.00 - 10.00 10*3/mm3 Final     RBC   Date Value Ref Range Status   10/04/2018 4.66 3.90 - 5.00 10*6/mm3 Final     Hemoglobin   Date Value Ref Range Status   10/04/2018 13.4 11.5 - 14.9 g/dL Final     Hematocrit   Date Value Ref Range Status   10/04/2018 40.9 34.0 - 45.0 % Final     MCV   Date Value Ref Range Status   10/04/2018 87.8 83.0 - 97.0 fL Final     MCH   Date Value Ref Range Status   10/04/2018 28.8 27.0 - 33.0 pg Final     MCHC   Date Value Ref Range Status   10/04/2018 32.8 32.0 - 35.0 g/dL Final     RDW   Date Value Ref Range Status   10/04/2018 13.0 11.7 - 14.5 % Final     RDW-SD   Date Value Ref Range Status   10/04/2018 41.2 37.0 - 49.0 fl Final     MPV   Date Value Ref Range Status   10/04/2018 10.6 8.9 - 12.1 fL Final     Platelets   Date Value Ref Range Status   10/04/2018 211 150 - 375 10*3/mm3 Final     Neutrophil %   Date Value Ref Range Status   10/04/2018 65.8 39.0 - 75.0 % Final     Lymphocyte %   Date Value Ref Range Status   10/04/2018 17.3 (L) 20.0 - 49.0 % Final     Monocyte %   Date Value Ref Range Status   10/04/2018 11.5 4.0 - 12.0 % Final     Eosinophil %   Date Value Ref Range Status   10/04/2018 4.1 1.0 - 5.0 % Final     Basophil %   Date Value Ref Range Status   10/04/2018 0.7 0.0 - 1.1 % Final     Immature Grans %   Date Value Ref Range Status   10/04/2018 0.6 (H) 0.0 - 0.5 % Final     Neutrophils, Absolute   Date Value Ref Range Status   10/04/2018 4.48 1.50 - 7.00 10*3/mm3 Final     Lymphocytes, Absolute   Date Value Ref Range Status   10/04/2018 1.18 1.00 - 3.50 10*3/mm3 Final     Monocytes, Absolute   Date Value Ref Range Status   10/04/2018 0.78 0.25 - 0.80 10*3/mm3 Final      Eosinophils, Absolute   Date Value Ref Range Status   10/04/2018 0.28 0.00 - 0.36 10*3/mm3 Final     Basophils, Absolute   Date Value Ref Range Status   10/04/2018 0.05 0.00 - 0.10 10*3/mm3 Final     Immature Grans, Absolute   Date Value Ref Range Status   10/04/2018 0.04 (H) 0.00 - 0.03 10*3/mm3 Final     nRBC   Date Value Ref Range Status   10/04/2018 0.0 0.0 - 0.0 /100 WBC Final       Lab Results   Component Value Date    GLUCOSE 93 08/22/2017    BUN 24 (H) 08/22/2017    CREATININE 1.06 08/22/2017    EGFRIFNONA 49 (L) 08/22/2017    BCR 22.6 08/22/2017    CO2 22.6 08/22/2017    CALCIUM 8.8 08/22/2017    PROTENTOTREF 6.7 02/07/2018    ALBUMIN 3.8 02/07/2018    LABIL2 1.4 02/07/2018    AST 17 08/22/2017    ALT 11 08/22/2017         Esophagram 7/2017  CONCLUSION: Dysphagia with deep laryngeal penetration and trace  aspiration to the mainstem bronchi. This was predominantly silent. Mild  upper esophageal ring at the level of cricopharyngeus with associated  mild prominence of cricopharyngeus and with demonstration of a small  Zenker diverticulum. Slightly irregular and slightly thick Schatzki ring  narrowing of the esophagogastric junction above a moderately large and  predominantly sliding type hiatal herniation of the upper stomach with  gastroesophageal reflux, equivocal esophageal mucosal fold thickness  regarding presence or absence of esophagitis or other borderline  thickening of esophageal mucosal folds, and with some superimposed  spastic dysmotility of the distal thoracic esophagus.      This report was finalized on 7/17/2017 2:48 PM by Dr. Cem Mcbrdie MD.      Assessment/Plan    1. Dysphagia: worsening.  This is multifactorial-- esophagram with a Zenker's diverticulum, Schatzki's ring and hiatal hernia along with some spasticity of the distal esophagus, also issues when she is talking and eating.     2. Weight loss: down 10# with above, new issue    Plan  We discussed that overall it looks like she is  worsening.  While I do have reservations about pursuing an EGD at her age, she is becoming quite symptomatic at this time and offered her this procedure.  We did discuss the procedure and the risks as well as risks of sedation.  She does not want to pursue at this time.  She wants to make adjustments to her diet and I advise that she focus on softer foods including cooked vegetables, peeled fruits and only ground meat.  I also advised getting a  and she could blenderize some of her food as well.    Her son was with her today and I let them know that if she changes her mind about the EGD, to please call as I recommend to pursue it sooner rather than later as she is otherwise fairly healthy for being 89 years old.    She should continue the pantoprazole and carafate as they seem to help a bit.    She can follow up as needed    Randi was seen today for pharyngoesophageal dysphagia.    Diagnoses and all orders for this visit:    Pharyngoesophageal dysphagia    Regurgitation of food        Dictated utilizing Dragon dictation

## 2019-12-03 ENCOUNTER — DOCUMENTATION (OUTPATIENT)
Dept: ONCOLOGY | Facility: CLINIC | Age: 84
End: 2019-12-03

## 2019-12-03 NOTE — PROGRESS NOTES
I received a phone call from Dr. Guy at the ER in [place].  Patient presented with generalized weakness and hemoglobin was down to 10.3.  Rectal exam revealed positive stool Hemoccult.  She is going to be admitted overnight.  I recommended GI consult.  She and her family will contact us after discharge and will arrange for IV iron infusions (IV Injectafer x2 doses).  Last iron infusions with Feraheme were in August/September 2017.      Nadir Ceja MD   12/03/19

## 2019-12-05 ENCOUNTER — TELEPHONE (OUTPATIENT)
Dept: GASTROENTEROLOGY | Facility: CLINIC | Age: 84
End: 2019-12-05

## 2019-12-05 NOTE — TELEPHONE ENCOUNTER
VM to home #.  Person answering states to contact Ivelisse on her cell.    Did do (see hipaa).  Mail box full.  Will try back.

## 2019-12-05 NOTE — TELEPHONE ENCOUNTER
----- Message from Blair Aceves Rep sent at 12/5/2019 10:15 AM EST -----  Regarding: talk to nurse  Contact: [phone]  Pt daughter Ivelisse called and wants us to talk to pt she just got out of the hospital, another number  [phone]

## 2019-12-09 NOTE — TELEPHONE ENCOUNTER
Called pt and pt reports that she has had shingles in her left eye area.  She wanted to let Dr Perez know that her stomach has been dong well and she is improving from the shingles.  Advised will update Dr Perez. Pt verb understanding.

## 2019-12-16 ENCOUNTER — APPOINTMENT (OUTPATIENT)
Dept: ONCOLOGY | Facility: CLINIC | Age: 84
End: 2019-12-16

## 2019-12-16 ENCOUNTER — APPOINTMENT (OUTPATIENT)
Dept: LAB | Facility: HOSPITAL | Age: 84
End: 2019-12-16

## 2020-06-11 ENCOUNTER — TELEPHONE (OUTPATIENT)
Dept: ONCOLOGY | Facility: CLINIC | Age: 85
End: 2020-06-11

## 2020-06-11 NOTE — TELEPHONE ENCOUNTER
PT'S DGT CALLING REPORTING THAT PT IS WEAK AND SOB AND THINKS IRON IS LOW. WE HAVEN'T SEEN PT SINCE '18 AND PT'S PCP WAS SUPPOSED TO BE CHECKING PT'S LABS Q 6 MONTHS. THE DGT WAS ADAMANT THAT THE PT HAS SEEN US MULTIPLE TIMES SINCE THEN AND HAS HAD IRON INFUSIONS. ACCORDING TO CHART PT HASN'T SEEN MD SINCE '18 AND HASN'T HAD IRON INFUSION SINCE '17. SHE MUST BE MISTAKEN. S/W DR. PERSAUD AND HE SAID THAT SINCE PT'S PCP IS SUPPOSED TO BE FOLLOWING PT'S ANEMIA HE IS THE ONE THAT NEEDS TO ORDER LABS AND F/U W/ PT. CALLED DR. COFFEY'S OFFICE AND GAVE MESSAGE TO HIS NURSE THAT HE NEEDS TO ORDER LABS AND F/U W/ PT. THEY V/U AND SO DID DGT.

## 2020-06-18 ENCOUNTER — TELEPHONE (OUTPATIENT)
Dept: ONCOLOGY | Facility: CLINIC | Age: 85
End: 2020-06-18

## 2020-06-18 NOTE — TELEPHONE ENCOUNTER
Said they haven't heard anything about the phone call earlier about getting her an appt.      By Ivelisse Torre          Per previous notes, pt instructed to contact PCP

## 2020-06-19 ENCOUNTER — TELEPHONE (OUTPATIENT)
Dept: ONCOLOGY | Facility: CLINIC | Age: 85
End: 2020-06-19

## 2020-06-19 NOTE — TELEPHONE ENCOUNTER
I s/w pt's son Filiberto. He says she had labs drawn with Dr. Agosto office 3 days ago.  He s/w Dr. Agosto yesterday and Dr. Agosto instructed them to call Dr. Ceja to see about getting back in with him. Pt was last seen by Dr. Ceja December 2018.  Hgb 3 days ago 8.8.  No active bleeding. Dr. Ceja not in the office this afternoon, but will be back on Monday.  I printed out labs from 3 days ago and his last dictation and placed in a red folder on his desk to review on Monday when he is back in the office.  ? If he wants pt seen by him again and what labs he may want.  I informed pt's son of this and he is very agreeable and comfortable with this plan.  Informed him the triage RN would be back in touch on Monday after MD reviewed pt's recent lab work.

## 2020-06-22 ENCOUNTER — TELEPHONE (OUTPATIENT)
Dept: ONCOLOGY | Facility: CLINIC | Age: 85
End: 2020-06-22

## 2020-06-22 DIAGNOSIS — K90.9 MALABSORPTION OF IRON: ICD-10-CM

## 2020-06-22 DIAGNOSIS — D50.9 IRON DEFICIENCY ANEMIA, UNSPECIFIED IRON DEFICIENCY ANEMIA TYPE: ICD-10-CM

## 2020-06-22 RX ORDER — SODIUM CHLORIDE 9 MG/ML
250 INJECTION, SOLUTION INTRAVENOUS ONCE
Status: CANCELLED | OUTPATIENT
Start: 2020-06-25

## 2020-06-22 RX ORDER — DIPHENHYDRAMINE HCL 25 MG
25 CAPSULE ORAL ONCE
Status: CANCELLED | OUTPATIENT
Start: 2020-06-25

## 2020-06-22 RX ORDER — FAMOTIDINE 10 MG/ML
20 INJECTION, SOLUTION INTRAVENOUS ONCE
Status: CANCELLED | OUTPATIENT
Start: 2020-06-25

## 2020-06-22 NOTE — TELEPHONE ENCOUNTER
Per , pt needs to come in within the week and have cbc done, see NP or him, and start 1st dose (of 2) of injectafer. Will send msg to scheduling and oncology nursing to set up. I notified patients son of all of this and he v/u.

## 2020-06-22 NOTE — TELEPHONE ENCOUNTER
Patients son called to see if  got the msg left on his desk Friday about her low hgb and if she needed to come be seen again or not. msg sent to  to see if he has addressed this yet. Will call son back after hearing from MD.

## 2020-06-23 ENCOUNTER — OFFICE VISIT (OUTPATIENT)
Dept: ONCOLOGY | Facility: CLINIC | Age: 85
End: 2020-06-23

## 2020-06-23 ENCOUNTER — INFUSION (OUTPATIENT)
Dept: ONCOLOGY | Facility: HOSPITAL | Age: 85
End: 2020-06-23

## 2020-06-23 ENCOUNTER — LAB (OUTPATIENT)
Dept: LAB | Facility: HOSPITAL | Age: 85
End: 2020-06-23

## 2020-06-23 VITALS
OXYGEN SATURATION: 98 % | WEIGHT: 135.1 LBS | BODY MASS INDEX: 24.86 KG/M2 | RESPIRATION RATE: 16 BRPM | HEART RATE: 83 BPM | SYSTOLIC BLOOD PRESSURE: 128 MMHG | HEIGHT: 62 IN | TEMPERATURE: 97.7 F | DIASTOLIC BLOOD PRESSURE: 85 MMHG

## 2020-06-23 DIAGNOSIS — D50.9 IRON DEFICIENCY ANEMIA, UNSPECIFIED IRON DEFICIENCY ANEMIA TYPE: Primary | ICD-10-CM

## 2020-06-23 DIAGNOSIS — K90.9 MALABSORPTION OF IRON: Primary | ICD-10-CM

## 2020-06-23 DIAGNOSIS — D50.9 IRON DEFICIENCY ANEMIA, UNSPECIFIED IRON DEFICIENCY ANEMIA TYPE: ICD-10-CM

## 2020-06-23 DIAGNOSIS — K90.9 MALABSORPTION OF IRON: ICD-10-CM

## 2020-06-23 LAB
BASOPHILS # BLD AUTO: 0.05 10*3/MM3 (ref 0–0.2)
BASOPHILS NFR BLD AUTO: 1 % (ref 0–1.5)
DEPRECATED RDW RBC AUTO: 50.9 FL (ref 37–54)
EOSINOPHIL # BLD AUTO: 0.1 10*3/MM3 (ref 0–0.4)
EOSINOPHIL NFR BLD AUTO: 2.1 % (ref 0.3–6.2)
ERYTHROCYTE [DISTWIDTH] IN BLOOD BY AUTOMATED COUNT: 18 % (ref 12.3–15.4)
FERRITIN SERPL-MCNC: 12.8 NG/ML (ref 13–150)
HCT VFR BLD AUTO: 30.5 % (ref 34–46.6)
HGB BLD-MCNC: 8.5 G/DL (ref 12–15.9)
IMM GRANULOCYTES # BLD AUTO: 0.03 10*3/MM3 (ref 0–0.05)
IMM GRANULOCYTES NFR BLD AUTO: 0.6 % (ref 0–0.5)
IRON 24H UR-MRATE: 25 MCG/DL (ref 37–145)
IRON SATN MFR SERPL: 5 % (ref 14–48)
LYMPHOCYTES # BLD AUTO: 0.65 10*3/MM3 (ref 0.7–3.1)
LYMPHOCYTES NFR BLD AUTO: 13.4 % (ref 19.6–45.3)
MCH RBC QN AUTO: 21.6 PG (ref 26.6–33)
MCHC RBC AUTO-ENTMCNC: 27.9 G/DL (ref 31.5–35.7)
MCV RBC AUTO: 77.6 FL (ref 79–97)
MONOCYTES # BLD AUTO: 0.37 10*3/MM3 (ref 0.1–0.9)
MONOCYTES NFR BLD AUTO: 7.6 % (ref 5–12)
NEUTROPHILS # BLD AUTO: 3.64 10*3/MM3 (ref 1.7–7)
NEUTROPHILS NFR BLD AUTO: 75.3 % (ref 42.7–76)
NRBC BLD AUTO-RTO: 0 /100 WBC (ref 0–0.2)
PLATELET # BLD AUTO: 237 10*3/MM3 (ref 140–450)
PMV BLD AUTO: 9.6 FL (ref 6–12)
RBC # BLD AUTO: 3.93 10*6/MM3 (ref 3.77–5.28)
TIBC SERPL-MCNC: 466 MCG/DL (ref 249–505)
TRANSFERRIN SERPL-MCNC: 333 MG/DL (ref 200–360)
WBC NRBC COR # BLD: 4.84 10*3/MM3 (ref 3.4–10.8)

## 2020-06-23 PROCEDURE — 99213 OFFICE O/P EST LOW 20 MIN: CPT | Performed by: NURSE PRACTITIONER

## 2020-06-23 PROCEDURE — 82728 ASSAY OF FERRITIN: CPT | Performed by: NURSE PRACTITIONER

## 2020-06-23 PROCEDURE — 84466 ASSAY OF TRANSFERRIN: CPT

## 2020-06-23 PROCEDURE — 96375 TX/PRO/DX INJ NEW DRUG ADDON: CPT

## 2020-06-23 PROCEDURE — 96374 THER/PROPH/DIAG INJ IV PUSH: CPT

## 2020-06-23 PROCEDURE — 85025 COMPLETE CBC W/AUTO DIFF WBC: CPT

## 2020-06-23 PROCEDURE — 36415 COLL VENOUS BLD VENIPUNCTURE: CPT

## 2020-06-23 PROCEDURE — 63710000001 DIPHENHYDRAMINE PER 50 MG: Performed by: INTERNAL MEDICINE

## 2020-06-23 PROCEDURE — 25010000002 FERRIC CARBOXYMALTOSE 750 MG/15ML SOLUTION 15 ML VIAL: Performed by: INTERNAL MEDICINE

## 2020-06-23 PROCEDURE — 83540 ASSAY OF IRON: CPT

## 2020-06-23 RX ORDER — SODIUM CHLORIDE 9 MG/ML
250 INJECTION, SOLUTION INTRAVENOUS ONCE
Status: CANCELLED | OUTPATIENT
Start: 2020-06-30

## 2020-06-23 RX ORDER — FAMOTIDINE 10 MG/ML
20 INJECTION, SOLUTION INTRAVENOUS ONCE
Status: CANCELLED | OUTPATIENT
Start: 2020-06-30

## 2020-06-23 RX ORDER — DIPHENHYDRAMINE HCL 25 MG
25 CAPSULE ORAL ONCE
Status: COMPLETED | OUTPATIENT
Start: 2020-06-23 | End: 2020-06-23

## 2020-06-23 RX ORDER — DIPHENHYDRAMINE HCL 25 MG
25 CAPSULE ORAL ONCE
Status: CANCELLED | OUTPATIENT
Start: 2020-06-30

## 2020-06-23 RX ORDER — SODIUM CHLORIDE 9 MG/ML
250 INJECTION, SOLUTION INTRAVENOUS ONCE
Status: COMPLETED | OUTPATIENT
Start: 2020-06-23 | End: 2020-06-23

## 2020-06-23 RX ORDER — FAMOTIDINE 10 MG/ML
20 INJECTION, SOLUTION INTRAVENOUS ONCE
Status: COMPLETED | OUTPATIENT
Start: 2020-06-23 | End: 2020-06-23

## 2020-06-23 RX ADMIN — SODIUM CHLORIDE 250 ML: 900 INJECTION, SOLUTION INTRAVENOUS at 15:47

## 2020-06-23 RX ADMIN — DIPHENHYDRAMINE HYDROCHLORIDE 25 MG: 25 CAPSULE ORAL at 15:47

## 2020-06-23 RX ADMIN — FERRIC CARBOXYMALTOSE INJECTION 750 MG: 50 INJECTION, SOLUTION INTRAVENOUS at 15:49

## 2020-06-23 RX ADMIN — FAMOTIDINE 20 MG: 10 INJECTION INTRAVENOUS at 15:47

## 2020-06-23 NOTE — PROGRESS NOTES
Subjective     CHIEF COMPLAINT:  Low iron    Chief Complaint   Patient presents with   • Follow-up     no concerns         HISTORY OF PRESENT ILLNESS:     Randi eMra is a 89 y.o. female patient who returns today due to recent findings of a declining hemoglobin and iron deficiency at an outside facility.  She did have an episode a few weeks ago of hematemesis and dark stool.  She was evaluated at the ER as well as her PCP.  She does not wish to pursue GI workup due to her age and the limited event.  She does continue her Carafate and pantoprozole for previous ulcers.  She denies any current known bleeding.  She does report fatigue.  She denies shortness of breath.      Past Medical History:   Diagnosis Date   • Anemia    • Degenerative arthritis    • Fatigue    • GERD (gastroesophageal reflux disease)    • Hiatal hernia    • Hypertension    • Monoclonal gammopathy of undetermined significance    • Rheumatic fever     AS A CHILD   • Schatzki's ring    • Ulcer    • Zenker diverticulum        Past Surgical History:   Procedure Laterality Date   • CHOLECYSTECTOMY  2008   • COLONOSCOPY  approx 2012    normal per patient  [facility]   • HYSTERECTOMY     • REPLACEMENT TOTAL KNEE Right 2000       Cancer-related family history includes Cancer in her brother and brother.  Social History     Tobacco Use   • Smoking status: Current Some Day Smoker     Packs/day: 0.25     Types: Cigarettes     Start date: 1940   • Smokeless tobacco: Former User   • Tobacco comment: off and on thru out life   Substance Use Topics   • Alcohol use: Yes     Comment: seldom       MEDICATIONS:    Current Outpatient Medications:   •  amLODIPine (NORVASC) 10 MG tablet, Take 10 mg by mouth daily., Disp: , Rfl:   •  DiphenhydrAMINE HCl (BENADRYL ALLERGY PO), Take  by mouth., Disp: , Rfl:   •  escitalopram (LEXAPRO) 10 MG tablet, Take 10 mg by mouth daily., Disp: , Rfl:   •  hydrochlorothiazide (HYDRODIURIL) 12.5 MG tablet, Take 12.5 mg by mouth  Daily., Disp: , Rfl: 11  •  Magnesium Gluconate (MAGNESIUM 27 PO), Take  by mouth., Disp: , Rfl:   •  methylcellulose, Laxative, (CITRUCEL) 500 MG tablet tablet, Take 2 tablets by mouth 2 (Two) Times a Day With Meals., Disp: 120 tablet, Rfl: 3  •  Multiple Vitamins-Minerals (CENTRUM SILVER PO), Take  by mouth Daily., Disp: , Rfl:   •  Multiple Vitamins-Minerals (OCUVITE PO), Take  by mouth Daily., Disp: , Rfl:   •  pantoprazole (PROTONIX) 40 MG EC tablet, TAKE ONE TABLET BY MOUTH EVERY DAY, Disp: , Rfl: 5  •  POTASSIUM CHLORIDE PO, Take  by mouth., Disp: , Rfl:   •  sucralfate (CARAFATE) 1 g tablet, Take 1 g by mouth 4 (Four) Times a Day. Pt mixes with water and takes 4 times daily, Disp: , Rfl:   •  SYMBICORT 160-4.5 MCG/ACT inhaler, TWO PUFFS EVERY TWELVE HOURS RINSE MOUTH AFTER EACH USE., Disp: , Rfl: 11  •  TOVIAZ 4 MG tablet sustained-release 24 hour tablet, Take 4 mg by mouth Daily., Disp: , Rfl: 11    ALLERGIES:  Allergies   Allergen Reactions   • Actonel [Risedronate Sodium] Unknown - Low Severity   • Furacin [Nitrofurazone] Unknown - Low Severity   • Lambs Quarters Unknown - Low Severity   • Latex Unknown - Low Severity   • Prozac [Fluoxetine Hcl] Unknown - Low Severity   • Shellfish-Derived Products Unknown - Low Severity       REVIEW OF SYSTEMS:  Review of Systems   Constitutional: Negative for chills, fever and unexpected weight change.   HENT: Negative for mouth sores, nosebleeds, sore throat and voice change.    Eyes: Negative for visual disturbance.   Respiratory: Negative for cough and shortness of breath.    Cardiovascular: Negative for chest pain and leg swelling.   Gastrointestinal: Negative for abdominal pain, blood in stool, constipation, diarrhea, nausea and vomiting.        Episode a few weeks ago of hemtaemesis and dark stool-evaluated by ER and PCP   Genitourinary: Negative for dysuria, frequency and hematuria.   Musculoskeletal: Negative for arthralgias, back pain and joint swelling.  "  Skin: Negative for rash.   Neurological: Negative for dizziness, numbness and headaches.   Hematological: Negative for adenopathy. Does not bruise/bleed easily.   Psychiatric/Behavioral: Negative for dysphoric mood. The patient is not nervous/anxious.          Objective   VITAL SIGNS:     Vitals:    06/23/20 1506   BP: 128/85   Pulse: 83   Resp: 16   Temp: 97.7 °F (36.5 °C)   TempSrc: Oral   SpO2: 98%   Weight: 61.3 kg (135 lb 1.6 oz)   Height: 157.5 cm (62.01\")   PainSc: 0-No pain     Body mass index is 24.7 kg/m².     Wt Readings from Last 3 Encounters:   06/23/20 61.3 kg (135 lb 1.6 oz)   01/16/19 64.1 kg (141 lb 6.4 oz)   10/04/18 63.6 kg (140 lb 3.2 oz)       PHYSICAL EXAMINATION:  GENERAL:  The patient appears in good general condition, not in acute distress.  SKIN: Warm and dry. No skin rashes, ecchymosis or petechiae.  HEAD:  Normocephalic.  EYES:  No Jaundice. No Pallor. Pupils equal. EOMI.  ABDOMEN:  Soft. No tenderness. No Hepatomegaly. No Splenomegaly. No masses.  EXTREMITIES:  No arthritic changes. No Edema. No Calf tenderness.  NEUROLOGICAL:  No Focal neurological deficits.      DIAGNOSTIC DATA:     Results from last 7 days   Lab Units 06/23/20  1454   WBC 10*3/mm3 4.84   NEUTROS ABS 10*3/mm3 3.64   HEMOGLOBIN g/dL 8.5*   HEMATOCRIT % 30.5*   PLATELETS 10*3/mm3 237     Results from last 7 days   Lab Units 06/23/20  1454   FERRITIN ng/mL 12.80*   IRON mcg/dL 25*   TIBC mcg/dL 466         Assessment/Plan   1.  Iron deficiency anemia. She did not tolerate oral iron.  She developed upset stomach.  She did not absorbing the iron and multivitamin.  Therefore, we gave her Feraheme ×2 doses in January 2017 and in August of September 2017.  Recent blood work from outside facility showed iron deficiency.  Patient returns today with ferritin down to 12, iron saturation 5%, and hemoglobin 8.5.  We will proceed with Injectafer x2.  We will recheck a CBC next week.  We will have her follow-up in 3 months with " Marcial with repeat labs.    2.  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  M protein was 0.7 on 8/22/17 but decreased to 0.5 on 2/7/18.  Based on the low level and the patient being 89 years old, Dr. Jo previously would not recommend additional monitoring of this.    3.  History of hypomagnesemia.  She had a low magnesium level in the past.     PLAN:    Patient was referred back to our office for iron deficiency once again.  Hemoglobin is 8.5, ferritin 12, iron saturation 5% we will proceed with Injectafer x2.  We will repeat a CBC next week.  She will then follow-up with Dr. Truong in 3 months.        JOSSY Poe

## 2020-06-30 ENCOUNTER — INFUSION (OUTPATIENT)
Dept: ONCOLOGY | Facility: HOSPITAL | Age: 85
End: 2020-06-30

## 2020-06-30 VITALS
SYSTOLIC BLOOD PRESSURE: 133 MMHG | OXYGEN SATURATION: 96 % | RESPIRATION RATE: 16 BRPM | DIASTOLIC BLOOD PRESSURE: 79 MMHG | BODY MASS INDEX: 24.58 KG/M2 | TEMPERATURE: 97.6 F | WEIGHT: 134.4 LBS | HEART RATE: 79 BPM

## 2020-06-30 DIAGNOSIS — K90.9 MALABSORPTION OF IRON: Primary | ICD-10-CM

## 2020-06-30 DIAGNOSIS — D50.9 IRON DEFICIENCY ANEMIA, UNSPECIFIED IRON DEFICIENCY ANEMIA TYPE: ICD-10-CM

## 2020-06-30 PROCEDURE — 25010000002 FERRIC CARBOXYMALTOSE 750 MG/15ML SOLUTION 15 ML VIAL: Performed by: NURSE PRACTITIONER

## 2020-06-30 PROCEDURE — 96374 THER/PROPH/DIAG INJ IV PUSH: CPT

## 2020-06-30 PROCEDURE — 96375 TX/PRO/DX INJ NEW DRUG ADDON: CPT

## 2020-06-30 PROCEDURE — 63710000001 DIPHENHYDRAMINE PER 50 MG: Performed by: NURSE PRACTITIONER

## 2020-06-30 RX ORDER — FAMOTIDINE 10 MG/ML
20 INJECTION, SOLUTION INTRAVENOUS ONCE
Status: COMPLETED | OUTPATIENT
Start: 2020-06-30 | End: 2020-06-30

## 2020-06-30 RX ORDER — SODIUM CHLORIDE 9 MG/ML
250 INJECTION, SOLUTION INTRAVENOUS ONCE
Status: COMPLETED | OUTPATIENT
Start: 2020-06-30 | End: 2020-06-30

## 2020-06-30 RX ORDER — DIPHENHYDRAMINE HCL 25 MG
25 CAPSULE ORAL ONCE
Status: COMPLETED | OUTPATIENT
Start: 2020-06-30 | End: 2020-06-30

## 2020-06-30 RX ADMIN — SODIUM CHLORIDE 250 ML: 9 INJECTION, SOLUTION INTRAVENOUS at 15:25

## 2020-06-30 RX ADMIN — FERRIC CARBOXYMALTOSE INJECTION 750 MG: 50 INJECTION, SOLUTION INTRAVENOUS at 15:33

## 2020-06-30 RX ADMIN — DIPHENHYDRAMINE HYDROCHLORIDE 25 MG: 25 CAPSULE ORAL at 15:23

## 2020-06-30 RX ADMIN — FAMOTIDINE 20 MG: 10 INJECTION INTRAVENOUS at 15:25

## 2021-08-14 PROBLEM — D47.2 MONOCLONAL GAMMOPATHY OF UNKNOWN SIGNIFICANCE (MGUS): Status: ACTIVE | Noted: 2021-08-14

## 2021-08-14 PROBLEM — M19.90 ARTHRITIS: Status: ACTIVE | Noted: 2021-08-14

## 2021-08-14 PROBLEM — I00 RHEUMATIC FEVER: Status: ACTIVE | Noted: 2021-08-14

## 2021-08-14 PROBLEM — J45.909 ASTHMA: Status: ACTIVE | Noted: 2021-08-14

## 2021-08-14 PROBLEM — F32.A DEPRESSIVE DISORDER: Status: ACTIVE | Noted: 2021-08-14

## 2021-08-14 PROBLEM — I45.9 CARDIAC CONDUCTION DISORDER: Status: ACTIVE | Noted: 2021-08-14

## 2021-08-14 PROBLEM — J30.9 ALLERGIC RHINITIS: Status: ACTIVE | Noted: 2021-08-14

## 2021-08-14 PROBLEM — M12.811 ROTATOR CUFF ARTHROPATHY OF RIGHT SHOULDER: Status: ACTIVE | Noted: 2021-03-26

## 2021-08-14 PROBLEM — K21.9 GASTROESOPHAGEAL REFLUX DISEASE: Status: ACTIVE | Noted: 2021-08-14

## 2021-08-14 PROBLEM — D64.9 ANEMIA: Status: ACTIVE | Noted: 2021-08-14
